# Patient Record
Sex: MALE | Race: WHITE | NOT HISPANIC OR LATINO | Employment: FULL TIME | URBAN - METROPOLITAN AREA
[De-identification: names, ages, dates, MRNs, and addresses within clinical notes are randomized per-mention and may not be internally consistent; named-entity substitution may affect disease eponyms.]

---

## 2017-05-22 ENCOUNTER — HOSPITAL ENCOUNTER (OUTPATIENT)
Dept: RADIOLOGY | Facility: HOSPITAL | Age: 37
Discharge: HOME | End: 2017-05-22
Attending: ORTHOPAEDIC SURGERY
Payer: COMMERCIAL

## 2017-05-22 ENCOUNTER — ALLSCRIPTS OFFICE VISIT (OUTPATIENT)
Dept: OTHER | Facility: OTHER | Age: 37
End: 2017-05-22

## 2017-05-22 DIAGNOSIS — M76.71 PERONEAL TENDINITIS OF RIGHT LOWER EXTREMITY: ICD-10-CM

## 2017-05-22 DIAGNOSIS — M25.571 PAIN IN RIGHT ANKLE: ICD-10-CM

## 2017-05-22 PROCEDURE — 73610 X-RAY EXAM OF ANKLE: CPT

## 2017-05-31 ENCOUNTER — APPOINTMENT (OUTPATIENT)
Dept: PHYSICAL THERAPY | Facility: OTHER | Age: 37
End: 2017-05-31
Payer: COMMERCIAL

## 2017-05-31 ENCOUNTER — GENERIC CONVERSION - ENCOUNTER (OUTPATIENT)
Dept: OTHER | Facility: OTHER | Age: 37
End: 2017-05-31

## 2017-05-31 PROCEDURE — 97140 MANUAL THERAPY 1/> REGIONS: CPT

## 2017-05-31 PROCEDURE — 97110 THERAPEUTIC EXERCISES: CPT

## 2017-05-31 PROCEDURE — 97161 PT EVAL LOW COMPLEX 20 MIN: CPT

## 2017-06-02 ENCOUNTER — APPOINTMENT (OUTPATIENT)
Dept: PHYSICAL THERAPY | Facility: OTHER | Age: 37
End: 2017-06-02
Payer: COMMERCIAL

## 2017-06-02 PROCEDURE — 97112 NEUROMUSCULAR REEDUCATION: CPT

## 2017-06-02 PROCEDURE — 97140 MANUAL THERAPY 1/> REGIONS: CPT

## 2017-06-02 PROCEDURE — 97110 THERAPEUTIC EXERCISES: CPT

## 2017-06-07 ENCOUNTER — GENERIC CONVERSION - ENCOUNTER (OUTPATIENT)
Dept: OTHER | Facility: OTHER | Age: 37
End: 2017-06-07

## 2017-06-08 ENCOUNTER — APPOINTMENT (OUTPATIENT)
Dept: PHYSICAL THERAPY | Facility: OTHER | Age: 37
End: 2017-06-08
Payer: COMMERCIAL

## 2017-06-13 ENCOUNTER — APPOINTMENT (OUTPATIENT)
Dept: PHYSICAL THERAPY | Facility: OTHER | Age: 37
End: 2017-06-13
Payer: COMMERCIAL

## 2017-06-13 PROCEDURE — 97140 MANUAL THERAPY 1/> REGIONS: CPT

## 2017-06-13 PROCEDURE — 97110 THERAPEUTIC EXERCISES: CPT

## 2017-06-13 PROCEDURE — 97112 NEUROMUSCULAR REEDUCATION: CPT

## 2017-06-15 ENCOUNTER — APPOINTMENT (OUTPATIENT)
Dept: PHYSICAL THERAPY | Facility: OTHER | Age: 37
End: 2017-06-15
Payer: COMMERCIAL

## 2017-06-21 ENCOUNTER — APPOINTMENT (OUTPATIENT)
Dept: PHYSICAL THERAPY | Facility: OTHER | Age: 37
End: 2017-06-21
Payer: COMMERCIAL

## 2017-06-21 PROCEDURE — 97112 NEUROMUSCULAR REEDUCATION: CPT

## 2017-06-21 PROCEDURE — 97110 THERAPEUTIC EXERCISES: CPT

## 2017-06-21 PROCEDURE — 97140 MANUAL THERAPY 1/> REGIONS: CPT

## 2017-06-27 ENCOUNTER — APPOINTMENT (OUTPATIENT)
Dept: PHYSICAL THERAPY | Facility: OTHER | Age: 37
End: 2017-06-27
Payer: COMMERCIAL

## 2017-06-27 ENCOUNTER — ALLSCRIPTS OFFICE VISIT (OUTPATIENT)
Dept: OTHER | Facility: OTHER | Age: 37
End: 2017-06-27

## 2017-06-27 PROCEDURE — 97112 NEUROMUSCULAR REEDUCATION: CPT

## 2017-06-27 PROCEDURE — 97140 MANUAL THERAPY 1/> REGIONS: CPT

## 2017-06-27 PROCEDURE — 97110 THERAPEUTIC EXERCISES: CPT

## 2017-06-29 ENCOUNTER — APPOINTMENT (OUTPATIENT)
Dept: PHYSICAL THERAPY | Facility: OTHER | Age: 37
End: 2017-06-29
Payer: COMMERCIAL

## 2017-06-29 PROCEDURE — 97110 THERAPEUTIC EXERCISES: CPT

## 2017-06-29 PROCEDURE — 97140 MANUAL THERAPY 1/> REGIONS: CPT

## 2017-07-03 ENCOUNTER — APPOINTMENT (OUTPATIENT)
Dept: PHYSICAL THERAPY | Facility: OTHER | Age: 37
End: 2017-07-03
Payer: COMMERCIAL

## 2017-07-03 PROCEDURE — 97110 THERAPEUTIC EXERCISES: CPT

## 2017-07-03 PROCEDURE — 97112 NEUROMUSCULAR REEDUCATION: CPT

## 2017-07-03 PROCEDURE — 97140 MANUAL THERAPY 1/> REGIONS: CPT

## 2017-07-06 ENCOUNTER — APPOINTMENT (OUTPATIENT)
Dept: PHYSICAL THERAPY | Facility: OTHER | Age: 37
End: 2017-07-06
Payer: COMMERCIAL

## 2018-01-13 VITALS
SYSTOLIC BLOOD PRESSURE: 110 MMHG | BODY MASS INDEX: 26.81 KG/M2 | DIASTOLIC BLOOD PRESSURE: 70 MMHG | HEIGHT: 69 IN | WEIGHT: 181 LBS | HEART RATE: 63 BPM

## 2018-01-14 VITALS
BODY MASS INDEX: 26.58 KG/M2 | SYSTOLIC BLOOD PRESSURE: 120 MMHG | WEIGHT: 180 LBS | DIASTOLIC BLOOD PRESSURE: 69 MMHG | HEART RATE: 54 BPM

## 2018-10-28 ENCOUNTER — APPOINTMENT (EMERGENCY)
Dept: RADIOLOGY | Facility: HOSPITAL | Age: 38
End: 2018-10-28
Payer: COMMERCIAL

## 2018-10-28 ENCOUNTER — HOSPITAL ENCOUNTER (EMERGENCY)
Facility: HOSPITAL | Age: 38
Discharge: HOME | End: 2018-10-28
Attending: EMERGENCY MEDICINE
Payer: COMMERCIAL

## 2018-10-28 VITALS
BODY MASS INDEX: 26.5 KG/M2 | TEMPERATURE: 100.8 F | WEIGHT: 179.45 LBS | DIASTOLIC BLOOD PRESSURE: 61 MMHG | RESPIRATION RATE: 20 BRPM | SYSTOLIC BLOOD PRESSURE: 141 MMHG | HEART RATE: 105 BPM | OXYGEN SATURATION: 94 %

## 2018-10-28 DIAGNOSIS — J18.9 COMMUNITY ACQUIRED PNEUMONIA: Primary | ICD-10-CM

## 2018-10-28 LAB
ALBUMIN SERPL BCP-MCNC: 3.5 G/DL (ref 3.5–5)
ALP SERPL-CCNC: 67 U/L (ref 46–116)
ALT SERPL W P-5'-P-CCNC: 45 U/L (ref 12–78)
ANION GAP SERPL CALCULATED.3IONS-SCNC: 10 MMOL/L (ref 4–13)
AST SERPL W P-5'-P-CCNC: 23 U/L (ref 5–45)
BASOPHILS # BLD AUTO: 0.01 THOUSANDS/ΜL (ref 0–0.1)
BASOPHILS NFR BLD AUTO: 0 % (ref 0–1)
BILIRUB SERPL-MCNC: 1 MG/DL (ref 0.2–1)
BUN SERPL-MCNC: 11 MG/DL (ref 5–25)
CALCIUM SERPL-MCNC: 8.9 MG/DL (ref 8.3–10.1)
CHLORIDE SERPL-SCNC: 100 MMOL/L (ref 100–108)
CO2 SERPL-SCNC: 28 MMOL/L (ref 21–32)
CREAT SERPL-MCNC: 1.16 MG/DL (ref 0.6–1.3)
EOSINOPHIL # BLD AUTO: 0.11 THOUSAND/ΜL (ref 0–0.61)
EOSINOPHIL NFR BLD AUTO: 3 % (ref 0–6)
ERYTHROCYTE [DISTWIDTH] IN BLOOD BY AUTOMATED COUNT: 12.1 % (ref 11.6–15.1)
GFR SERPL CREATININE-BSD FRML MDRD: 78 ML/MIN/1.73SQ M
GLUCOSE SERPL-MCNC: 120 MG/DL (ref 65–140)
HCT VFR BLD AUTO: 37.2 % (ref 36.5–49.3)
HGB BLD-MCNC: 12.5 G/DL (ref 12–17)
IMM GRANULOCYTES # BLD AUTO: 0.01 THOUSAND/UL (ref 0–0.2)
IMM GRANULOCYTES NFR BLD AUTO: 0 % (ref 0–2)
INR PPP: 1.11 (ref 0.86–1.17)
LACTATE SERPL-SCNC: 1 MMOL/L (ref 0.5–2)
LYMPHOCYTES # BLD AUTO: 0.63 THOUSANDS/ΜL (ref 0.6–4.47)
LYMPHOCYTES NFR BLD AUTO: 15 % (ref 14–44)
MCH RBC QN AUTO: 30 PG (ref 26.8–34.3)
MCHC RBC AUTO-ENTMCNC: 33.6 G/DL (ref 31.4–37.4)
MCV RBC AUTO: 89 FL (ref 82–98)
MONOCYTES # BLD AUTO: 0.47 THOUSAND/ΜL (ref 0.17–1.22)
MONOCYTES NFR BLD AUTO: 11 % (ref 4–12)
NEUTROPHILS # BLD AUTO: 3.11 THOUSANDS/ΜL (ref 1.85–7.62)
NEUTS SEG NFR BLD AUTO: 71 % (ref 43–75)
NRBC BLD AUTO-RTO: 0 /100 WBCS
PLATELET # BLD AUTO: 200 THOUSANDS/UL (ref 149–390)
PMV BLD AUTO: 10.5 FL (ref 8.9–12.7)
POTASSIUM SERPL-SCNC: 3.4 MMOL/L (ref 3.5–5.3)
PROT SERPL-MCNC: 7 G/DL (ref 6.4–8.2)
PROTHROMBIN TIME: 14 SECONDS (ref 11.8–14.2)
RBC # BLD AUTO: 4.17 MILLION/UL (ref 3.88–5.62)
SODIUM SERPL-SCNC: 138 MMOL/L (ref 136–145)
WBC # BLD AUTO: 4.34 THOUSAND/UL (ref 4.31–10.16)

## 2018-10-28 PROCEDURE — 99283 EMERGENCY DEPT VISIT LOW MDM: CPT

## 2018-10-28 PROCEDURE — 87631 RESP VIRUS 3-5 TARGETS: CPT | Performed by: EMERGENCY MEDICINE

## 2018-10-28 PROCEDURE — 36415 COLL VENOUS BLD VENIPUNCTURE: CPT | Performed by: EMERGENCY MEDICINE

## 2018-10-28 PROCEDURE — 85610 PROTHROMBIN TIME: CPT | Performed by: EMERGENCY MEDICINE

## 2018-10-28 PROCEDURE — 83605 ASSAY OF LACTIC ACID: CPT | Performed by: EMERGENCY MEDICINE

## 2018-10-28 PROCEDURE — 71046 X-RAY EXAM CHEST 2 VIEWS: CPT

## 2018-10-28 PROCEDURE — 85025 COMPLETE CBC W/AUTO DIFF WBC: CPT | Performed by: EMERGENCY MEDICINE

## 2018-10-28 PROCEDURE — 80053 COMPREHEN METABOLIC PANEL: CPT | Performed by: EMERGENCY MEDICINE

## 2018-10-28 RX ORDER — DOXYCYCLINE HYCLATE 100 MG/1
100 CAPSULE ORAL ONCE
Status: DISCONTINUED | OUTPATIENT
Start: 2018-10-28 | End: 2018-10-28 | Stop reason: HOSPADM

## 2018-10-28 RX ORDER — BENZONATATE 100 MG/1
100 CAPSULE ORAL 3 TIMES DAILY PRN
Qty: 15 CAPSULE | Refills: 0 | Status: SHIPPED | OUTPATIENT
Start: 2018-10-28 | End: 2019-07-27

## 2018-10-28 RX ORDER — LEVOFLOXACIN 750 MG/1
750 TABLET ORAL EVERY 24 HOURS
Qty: 4 TABLET | Refills: 0 | Status: SHIPPED | OUTPATIENT
Start: 2018-10-29 | End: 2018-11-02

## 2018-10-28 RX ORDER — DOXYCYCLINE HYCLATE 100 MG/1
100 CAPSULE ORAL 2 TIMES DAILY
Qty: 13 CAPSULE | Refills: 0 | Status: SHIPPED | OUTPATIENT
Start: 2018-10-28 | End: 2018-10-28

## 2018-10-28 RX ORDER — 0.9 % SODIUM CHLORIDE 0.9 %
3 VIAL (ML) INJECTION AS NEEDED
Status: DISCONTINUED | OUTPATIENT
Start: 2018-10-28 | End: 2018-10-28 | Stop reason: HOSPADM

## 2018-10-28 RX ORDER — ONDANSETRON 4 MG/1
4 TABLET, ORALLY DISINTEGRATING ORAL ONCE
Status: COMPLETED | OUTPATIENT
Start: 2018-10-28 | End: 2018-10-28

## 2018-10-28 RX ORDER — IBUPROFEN 400 MG/1
400 TABLET ORAL ONCE
Status: COMPLETED | OUTPATIENT
Start: 2018-10-28 | End: 2018-10-28

## 2018-10-28 RX ORDER — ONDANSETRON 4 MG/1
4 TABLET, ORALLY DISINTEGRATING ORAL EVERY 6 HOURS PRN
Qty: 12 TABLET | Refills: 0 | Status: SHIPPED | OUTPATIENT
Start: 2018-10-28 | End: 2019-07-27

## 2018-10-28 RX ORDER — ACETAMINOPHEN 325 MG/1
650 TABLET ORAL ONCE
Status: COMPLETED | OUTPATIENT
Start: 2018-10-28 | End: 2018-10-28

## 2018-10-28 RX ADMIN — ACETAMINOPHEN 650 MG: 325 TABLET, FILM COATED ORAL at 12:18

## 2018-10-28 RX ADMIN — IBUPROFEN 400 MG: 400 TABLET ORAL at 13:21

## 2018-10-28 RX ADMIN — LEVOFLOXACIN 750 MG: 500 TABLET, FILM COATED ORAL at 12:32

## 2018-10-28 RX ADMIN — ONDANSETRON 4 MG: 4 TABLET, ORALLY DISINTEGRATING ORAL at 12:22

## 2018-10-28 NOTE — DISCHARGE INSTRUCTIONS
Drink plenty of fluids to stay well hydrated  Take levofloxacin orally daily for 4 days starting on Monday October 29th  You may take ondansetron as directed if needed for nausea  Community Acquired Pneumonia   WHAT YOU NEED TO KNOW:   Community-acquired pneumonia (CAP) is a lung infection that you get outside of a hospital or nursing home setting  Your lungs become inflamed and cannot work well  CAP may be caused by bacteria, viruses, or fungi  DISCHARGE INSTRUCTIONS:   Seek care immediately if:   · You are confused and cannot think clearly  · You have increased trouble breathing  · Your lips or fingernails turn gray or blue  Contact your healthcare provider if:   · Your symptoms do not get better, or they get worse  · You are urinating less, or not at all  · You have questions or concerns about your condition or care  Medicines:   · Medicines  may be given to treat a bacterial, viral, or fungal infection  You may also be given medicines to dilate your bronchial tubes to help you breathe more easily  · Take your medicine as directed  Contact your healthcare provider if you think your medicine is not helping or if you have side effects  Tell him or her if you are allergic to any medicine  Keep a list of the medicines, vitamins, and herbs you take  Include the amounts, and when and why you take them  Bring the list or the pill bottles to follow-up visits  Carry your medicine list with you in case of an emergency  Follow up with your healthcare provider within 3 days or as directed: You may need another x-ray  Write down your questions so you remember to ask them during your visits  Deep breathing and coughing:  Deep breathing helps open the air passages in your lungs  Coughing helps bring up mucus from your lungs  Take a deep breath and hold the breath as long as you can  Then push the air out of your lungs with a deep, strong cough  Spit out any mucus you have coughed up   Take 10 deep breaths in a row every hour that you are awake  Remember to follow each deep breath with a cough  Do not smoke or allow others to smoke around you:  Nicotine and other chemicals in cigarettes and cigars can cause lung damage  Ask your healthcare provider for information if you currently smoke and need help to quit  E-cigarettes or smokeless tobacco still contain nicotine  Talk to your healthcare provider before you use these products  Manage CAP at home:   · Breathe warm, moist air  This helps loosen mucus  Loosely place a warm, wet washcloth over your nose and mouth  A room humidifier may also help make the air moist     · Drink liquids as directed  Ask your healthcare provider how much liquid to drink each day and which liquids to drink  Liquids help make mucus thin and easier to get out of your body  · Gently tap your chest   This helps loosen mucus so it is easier to cough  Lie with your head lower than your chest several times a day and tap your chest      · Get plenty of rest   Rest helps your body heal   Prevent CAP:   · Wash your hands often with soap and water  Carry germ-killing hand gel with you  You can use the gel to clean your hands when soap and water are not available  Do not touch your eyes, nose, or mouth unless you have washed your hands first      · Clean surfaces often  Clean doorknobs, countertops, cell phones, and other surfaces that are touched often  · Always cover your mouth when you cough  Cough into a tissue or your shirtsleeve so you do not spread germs from your hands  · Try to avoid people who have a cold or the flu  If you are sick, stay away from others as much as possible  · Ask about vaccines  You may need a vaccine to help prevent pneumonia  Get an influenza (flu) vaccine every year as soon as it becomes available    © 2017 [employer] Information is for End User's use only and may not be sold, redistributed or otherwise used for commercial purposes  All illustrations and images included in CareNotes® are the copyrighted property of A [employer]  or [employer]  The above information is an  only  It is not intended as medical advice for individual conditions or treatments  Talk to your doctor, nurse or pharmacist before following any medical regimen to see if it is safe and effective for you

## 2018-10-28 NOTE — ED PROVIDER NOTES
History  Chief Complaint   Patient presents with    Fever - 9 weeks to 74 years     cough and throat discomfort since thursday, followed by fevers of 102-103, +chills     68-year-old male presents to the ED from home for evaluation with fever and cough  He notes that on his way to work on Thursday (3 days ago) he picked up [employer] lozenges for a slightly sore throat  He relates that later in the day he felt "crummy and chilled "  He relates that he took some ibuprofen and felt improved  He notes that on Friday he awoke feeling "okay " He worked all day and near the end relates that he felt like he was "crashing "  He has had myalgias since and on Saturday morning identified presence of fever in the 102 range  This responded partially to acetaminophen throughout the day yesterday  He has had ongoing nasal congestion since Wednesday, sore throat and cough  Cough has been minimally productive of white sputum  There has not been any hemoptysis  He did note intense chest discomfort on Wednesday when he went for a run  He appreciated significant expiratory wheezes following that which resolved w/ rest and did note some wheezes again overnight  He notes that he does not have a history of being bronchospastic  He is healthy at baseline and notes that he occasionally uses loratadine for hayfever  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   His wife works in a pediatric ED and his children have been sick with similar symptoms over this past week  One of their friends was additionally diagnosed with adenovirus last week  Family history significant for to siblings with asthma  He will be receiving his annual flu vaccine soon  None       History reviewed  No pertinent past medical history  History reviewed  No pertinent surgical history  History reviewed  No pertinent family history  I have reviewed and agree with the history as documented      Social History   Substance Use Topics    Smoking status: Passive Smoke Exposure - Never Smoker    Smokeless tobacco: Never Used    Alcohol use No        Review of Systems   Gastrointestinal: Negative for diarrhea, nausea and vomiting  Genitourinary: Negative for difficulty urinating  Skin: Negative for rash  All other systems reviewed and are negative  Physical Exam  Physical Exam   Constitutional: He is oriented to person, place, and time  He appears well-developed and well-nourished  HENT:   Head: Normocephalic  Right Ear: Tympanic membrane and ear canal normal    Left Ear: Tympanic membrane and ear canal normal    Nose: Rhinorrhea present  Mouth/Throat: Uvula is midline and oropharynx is clear and moist    Eyes: Conjunctivae and EOM are normal    Cardiovascular: Regular rhythm  Pulses:       Posterior tibial pulses are 2+ on the right side, and 2+ on the left side  Heart rate mildly elevated in the 90s  Pulmonary/Chest: Effort normal    Initial oxygen saturation was 94% on room air  With application of probe on the ear oxygen saturation picking up consistently at 100%  Not hypoxic  Patient very slightly coarse in the bibasilar region  Musculoskeletal: Normal range of motion  He exhibits no edema  Neurological: He is alert and oriented to person, place, and time  Skin: Skin is warm and dry  Psychiatric: He has a normal mood and affect  His behavior is normal    Nursing note and vitals reviewed        Vital Signs  ED Triage Vitals [10/28/18 1106]   Temperature Pulse Respirations Blood Pressure SpO2   (!) 100 8 °F (38 2 °C) 105 20 141/61 94 %      Temp Source Heart Rate Source Patient Position - Orthostatic VS BP Location FiO2 (%)   Oral -- -- Right arm --      Pain Score       --           Vitals:    10/28/18 1106   BP: 141/61   Pulse: 105       Visual Acuity      ED Medications  Medications   acetaminophen (TYLENOL) tablet 650 mg (650 mg Oral Given 10/28/18 1218)   ondansetron (ZOFRAN-ODT) dispersible tablet 4 mg (4 mg Oral Given 10/28/18 1222)   levofloxacin (LEVAQUIN) tablet 750 mg (750 mg Oral Given 10/28/18 1232)   ibuprofen (MOTRIN) tablet 400 mg (400 mg Oral Given 10/28/18 1321)       Diagnostic Studies  Results Reviewed     Procedure Component Value Units Date/Time    Lactic Acid x2 [[id number]]  (Normal) Collected:  10/28/18 1151    Lab Status:  Final result Specimen:  Blood from Arm, Right Updated:  10/28/18 1216     LACTIC ACID 1 0 mmol/L     Narrative:         Result may be elevated if tourniquet was used during collection  Comprehensive metabolic panel [[id number]]  (Abnormal) Collected:  10/28/18 1140    Lab Status:  Final result Specimen:  Blood from Arm, Left Updated:  10/28/18 1202     Sodium 138 mmol/L      Potassium 3 4 (L) mmol/L      Chloride 100 mmol/L      CO2 28 mmol/L      ANION GAP 10 mmol/L      BUN 11 mg/dL      Creatinine 1 16 mg/dL      Glucose 120 mg/dL      Calcium 8 9 mg/dL      AST 23 U/L      ALT 45 U/L      Alkaline Phosphatase 67 U/L      Total Protein 7 0 g/dL      Albumin 3 5 g/dL      Total Bilirubin 1 00 mg/dL      eGFR 78 ml/min/1 73sq m     Narrative:         National Kidney Disease Education Program recommendations are as follows:  GFR calculation is accurate only with a steady state creatinine  Chronic Kidney disease less than 60 ml/min/1 73 sq  meters  Kidney failure less than 15 ml/min/1 73 sq  meters      Protime-INR [[id number]]  (Normal) Collected:  10/28/18 1140    Lab Status:  Final result Specimen:  Blood from Arm, Left Updated:  10/28/18 1155     Protime 14 0 seconds      INR 1 11    CBC and differential [[id number]] Collected:  10/28/18 1140    Lab Status:  Final result Specimen:  Blood from Arm, Left Updated:  10/28/18 1146     WBC 4 34 Thousand/uL      RBC 4 17 Million/uL      Hemoglobin 12 5 g/dL      Hematocrit 37 2 %      MCV 89 fL      MCH 30 0 pg      MCHC 33 6 g/dL      RDW 12 1 %      MPV 10 5 fL      Platelets 161 Thousands/uL      nRBC 0 /100 WBCs      Neutrophils Relative 71 % Immat GRANS % 0 %      Lymphocytes Relative 15 %      Monocytes Relative 11 %      Eosinophils Relative 3 %      Basophils Relative 0 %      Neutrophils Absolute 3 11 Thousands/µL      Immature Grans Absolute 0 01 Thousand/uL      Lymphocytes Absolute 0 63 Thousands/µL      Monocytes Absolute 0 47 Thousand/µL      Eosinophils Absolute 0 11 Thousand/µL      Basophils Absolute 0 01 Thousands/µL     Influenza A/B and RSV by PCR (indicated for patients >2 mo of age) [[id number]] Collected:  10/28/18 1139    Lab Status: In process Specimen:  Nasopharyngeal from Nasopharyngeal Swab Updated:  10/28/18 1142    Lactic Acid x2 [[id number]]     Lab Status:  No result Specimen:  Blood                  XR chest 2 views   ED Interpretation by Dick Peterson MD (10/28 1211)   Small left lower lobe infiltrate                 Procedures  Procedures       Phone Contacts  ED Phone Contact    ED Course                               MDM  Number of Diagnoses or Management Options  Community acquired pneumonia:   Diagnosis management comments: Study results including presence of left lower infiltrate reviewed with patient  Imaging and blood work reviewed  Findings are most concerning for community-acquired pneumonia  Azithromycin would typically be 1st line though as discussed with patient given likely exposures at work to ill patients would consider levofloxacin for better coverage  Patient initially hesitant and decision was made for use of doxycycline  After short period of time in discussion with a family member who was an infectious disease specialist treatment plan was switched to levofloxacin  Ondansetron additionally prescribed for nausea which recently started and Tessalon to use as needed for symptomatic treatment      CritCare Time    Disposition  Final diagnoses:   Community acquired pneumonia     Time reflects when diagnosis was documented in both MDM as applicable and the Disposition within this note Time User Action Codes Description Comment    10/28/2018 12:12 PM Rody SANDOVAL Add [J18 9] Community acquired pneumonia       ED Disposition     ED Disposition Condition Comment    Discharge  Hoda Jean discharge to home/self care  Condition at discharge: Good        Follow-up Information     Follow up With Specialties Details Why Contact Info Additional [address], DO Internal Medicine  As needed [place] [address] Emergency Department Emergency Medicine  As needed, If symptoms worsen [address] [facility] [address], [address], [place], [place], [address]          Discharge Medication List as of 10/28/2018 12:30 PM      START taking these medications    Details   levofloxacin (LEVAQUIN) 750 mg tablet Take 1 tablet (750 mg total) by mouth every 24 hours for 4 days, Starting Mon 10/29/2018, Until Fri 11/2/2018, Normal      ondansetron (ZOFRAN-ODT) 4 mg disintegrating tablet Take 1 tablet (4 mg total) by mouth every 6 (six) hours as needed for nausea, Starting Sun 10/28/2018, Normal           No discharge procedures on file      ED Provider  Electronically Signed by           Jason Loyd MD  10/28/18 2242

## 2018-10-29 LAB
FLUAV AG SPEC QL: NORMAL
FLUBV AG SPEC QL: NORMAL
RSV B RNA SPEC QL NAA+PROBE: NORMAL

## 2019-07-27 ENCOUNTER — APPOINTMENT (EMERGENCY)
Dept: RADIOLOGY | Facility: HOSPITAL | Age: 39
End: 2019-07-27
Payer: COMMERCIAL

## 2019-07-27 ENCOUNTER — HOSPITAL ENCOUNTER (EMERGENCY)
Facility: HOSPITAL | Age: 39
Discharge: HOME | End: 2019-07-27
Attending: EMERGENCY MEDICINE
Payer: COMMERCIAL

## 2019-07-27 VITALS
SYSTOLIC BLOOD PRESSURE: 132 MMHG | RESPIRATION RATE: 18 BRPM | OXYGEN SATURATION: 99 % | TEMPERATURE: 97.5 F | WEIGHT: 177 LBS | HEART RATE: 55 BPM | DIASTOLIC BLOOD PRESSURE: 78 MMHG | BODY MASS INDEX: 26.14 KG/M2

## 2019-07-27 DIAGNOSIS — R10.9 ABDOMINAL PAIN: Primary | ICD-10-CM

## 2019-07-27 DIAGNOSIS — R19.7 NAUSEA VOMITING AND DIARRHEA: ICD-10-CM

## 2019-07-27 DIAGNOSIS — R11.2 NAUSEA VOMITING AND DIARRHEA: ICD-10-CM

## 2019-07-27 DIAGNOSIS — K57.30 DIVERTICULOSIS OF COLON: ICD-10-CM

## 2019-07-27 LAB
ALBUMIN SERPL BCP-MCNC: 3.9 G/DL (ref 3.5–5)
ALP SERPL-CCNC: 76 U/L (ref 46–116)
ALT SERPL W P-5'-P-CCNC: 44 U/L (ref 12–78)
ANION GAP SERPL CALCULATED.3IONS-SCNC: 4 MMOL/L (ref 4–13)
AST SERPL W P-5'-P-CCNC: 20 U/L (ref 5–45)
BASOPHILS # BLD AUTO: 0.03 THOUSANDS/ΜL (ref 0–0.1)
BASOPHILS NFR BLD AUTO: 1 % (ref 0–1)
BILIRUB SERPL-MCNC: 0.94 MG/DL (ref 0.2–1)
BILIRUB UR QL STRIP: NEGATIVE
BUN SERPL-MCNC: 14 MG/DL (ref 5–25)
CALCIUM SERPL-MCNC: 8.7 MG/DL (ref 8.3–10.1)
CHLORIDE SERPL-SCNC: 107 MMOL/L (ref 100–108)
CLARITY UR: CLEAR
CO2 SERPL-SCNC: 28 MMOL/L (ref 21–32)
COLOR UR: YELLOW
COLOR, POC: NORMAL
CREAT SERPL-MCNC: 0.94 MG/DL (ref 0.6–1.3)
EOSINOPHIL # BLD AUTO: 0.26 THOUSAND/ΜL (ref 0–0.61)
EOSINOPHIL NFR BLD AUTO: 5 % (ref 0–6)
ERYTHROCYTE [DISTWIDTH] IN BLOOD BY AUTOMATED COUNT: 12.2 % (ref 11.6–15.1)
GFR SERPL CREATININE-BSD FRML MDRD: 100 ML/MIN/1.73SQ M
GLUCOSE SERPL-MCNC: 88 MG/DL (ref 65–140)
GLUCOSE UR STRIP-MCNC: NEGATIVE MG/DL
HCT VFR BLD AUTO: 42.7 % (ref 36.5–49.3)
HGB BLD-MCNC: 14.4 G/DL (ref 12–17)
HGB UR QL STRIP.AUTO: NEGATIVE
IMM GRANULOCYTES # BLD AUTO: 0.01 THOUSAND/UL (ref 0–0.2)
IMM GRANULOCYTES NFR BLD AUTO: 0 % (ref 0–2)
KETONES UR STRIP-MCNC: ABNORMAL MG/DL
LEUKOCYTE ESTERASE UR QL STRIP: NEGATIVE
LIPASE SERPL-CCNC: 197 U/L (ref 73–393)
LYMPHOCYTES # BLD AUTO: 1.4 THOUSANDS/ΜL (ref 0.6–4.47)
LYMPHOCYTES NFR BLD AUTO: 29 % (ref 14–44)
MCH RBC QN AUTO: 30.1 PG (ref 26.8–34.3)
MCHC RBC AUTO-ENTMCNC: 33.7 G/DL (ref 31.4–37.4)
MCV RBC AUTO: 89 FL (ref 82–98)
MONOCYTES # BLD AUTO: 0.47 THOUSAND/ΜL (ref 0.17–1.22)
MONOCYTES NFR BLD AUTO: 10 % (ref 4–12)
NEUTROPHILS # BLD AUTO: 2.64 THOUSANDS/ΜL (ref 1.85–7.62)
NEUTS SEG NFR BLD AUTO: 55 % (ref 43–75)
NITRITE UR QL STRIP: NEGATIVE
NRBC BLD AUTO-RTO: 0 /100 WBCS
PH UR STRIP.AUTO: 6 [PH] (ref 4.5–8)
PLATELET # BLD AUTO: 207 THOUSANDS/UL (ref 149–390)
PMV BLD AUTO: 10.8 FL (ref 8.9–12.7)
POTASSIUM SERPL-SCNC: 3.6 MMOL/L (ref 3.5–5.3)
PROT SERPL-MCNC: 7.1 G/DL (ref 6.4–8.2)
PROT UR STRIP-MCNC: NEGATIVE MG/DL
RBC # BLD AUTO: 4.78 MILLION/UL (ref 3.88–5.62)
SODIUM SERPL-SCNC: 139 MMOL/L (ref 136–145)
SP GR UR STRIP.AUTO: 1.02 (ref 1–1.03)
UROBILINOGEN UR QL STRIP.AUTO: 0.2 E.U./DL
WBC # BLD AUTO: 4.81 THOUSAND/UL (ref 4.31–10.16)

## 2019-07-27 PROCEDURE — 74177 CT ABD & PELVIS W/CONTRAST: CPT

## 2019-07-27 PROCEDURE — 80053 COMPREHEN METABOLIC PANEL: CPT | Performed by: EMERGENCY MEDICINE

## 2019-07-27 PROCEDURE — 85025 COMPLETE CBC W/AUTO DIFF WBC: CPT | Performed by: EMERGENCY MEDICINE

## 2019-07-27 PROCEDURE — 99284 EMERGENCY DEPT VISIT MOD MDM: CPT

## 2019-07-27 PROCEDURE — 99284 EMERGENCY DEPT VISIT MOD MDM: CPT | Performed by: EMERGENCY MEDICINE

## 2019-07-27 PROCEDURE — 83690 ASSAY OF LIPASE: CPT | Performed by: EMERGENCY MEDICINE

## 2019-07-27 PROCEDURE — 36415 COLL VENOUS BLD VENIPUNCTURE: CPT | Performed by: EMERGENCY MEDICINE

## 2019-07-27 PROCEDURE — 81003 URINALYSIS AUTO W/O SCOPE: CPT

## 2019-07-27 RX ADMIN — IOHEXOL 100 ML: 350 INJECTION, SOLUTION INTRAVENOUS at 08:50

## 2019-07-27 NOTE — ED ATTENDING ATTESTATION
Kay Perez MD, saw and evaluated the patient  I have discussed the patient with the resident/non-physician practitioner and agree with the resident's/non-physician practitioner's findings, Plan of Care, and MDM as documented in the resident's/non-physician practitioner's note, except where noted  All available labs and Radiology studies were reviewed  I was present for key portions of any procedure(s) performed by the resident/non-physician practitioner and I was immediately available to provide assistance  At this point I agree with the current assessment done in the Emergency Department  I have conducted an independent evaluation of this patient a history and physical is as follows:    Patient presents the emergency department the complaint of feeling poorly for the last 5 days  The patient reports that he was in his normal state of good health until Tuesday when he developed fatigue nausea, cramping, and decreased appetite  The patient checked his tympanic temperature on the day and found to be 100 4  Since that time the patient has continued to have nausea, postprandial abdominal discomfort, and decreased appetite  The patient had diarrhea 4 days ago that lasted for 1 day but his bowels have been normal since then  The patient vomited once  The emesis occurred last evening  The patient reports occasional, waxing and waning, periumbilical discomfort  The patient denies any melena or hematochezia  There is no hematemesis  The patient had been exposed to his daughter would been sick with vomiting and abdominal discomfort 1 week prior to his illness and the neighbor's child who had been sick 3 days prior to the onset of his illness  Physical exam demonstrates a male in no acute distress  The patient appears to be well-hydrated  HEENT exam was normal   Lungs are clear with equal breath sounds  The heart had a regular rate rhythm    The abdomen is soft with minimal periumbilical and midepigastric tenderness  There is no rebound or guarding  No masses were appreciated  Extremities are symmetric and nontender  Skin had no rash      Critical Care Time  Procedures

## 2019-07-27 NOTE — ED PROVIDER NOTES
History  Chief Complaint   Patient presents with    Vomiting     gastric symptoms since tuesday, nausea, vomiting, diarrhea, fever    Fever - 9 weeks to 76 years     57-year-old man with a past medical history of hyperlipidemia presents for evaluation of abdominal pain  Symptoms started 5 days ago  He initially experience fever, chills and watery diarrhea  His children at home experienced similar symptoms and are now well  He has continued to experience abdominal pain that is postprandial   He describes it as bloating sensation  It is relieved with vomiting and Zofran  The pain is periumbilical and does not radiate  Patient has had no further episodes of diarrhea  Denies fever, chills at this time  Denies dysuria, hematuria, cp, sob  None       Past Medical History:   Diagnosis Date    Hyperlipidemia        History reviewed  No pertinent surgical history  History reviewed  No pertinent family history  I have reviewed and agree with the history as documented  Social History     Tobacco Use    Smoking status: Never Smoker    Smokeless tobacco: Never Used   Substance Use Topics    Alcohol use: No    Drug use: No        Review of Systems   Constitutional: Positive for chills, diaphoresis and fever  Negative for appetite change and fatigue  HENT: Negative for congestion, rhinorrhea and sore throat  Respiratory: Negative for apnea, cough, choking, chest tightness, shortness of breath, wheezing and stridor  Cardiovascular: Negative for chest pain, palpitations and leg swelling  Gastrointestinal: Positive for abdominal pain, diarrhea, nausea and vomiting  Negative for abdominal distention and constipation  Genitourinary: Negative for dysuria and hematuria  Musculoskeletal: Negative for back pain, neck pain and neck stiffness  Skin: Negative for pallor, rash and wound  Neurological: Negative for dizziness, light-headedness and headaches     Psychiatric/Behavioral: Negative for behavioral problems and confusion  All other systems reviewed and are negative  Physical Exam  ED Triage Vitals   Temperature Pulse Respirations Blood Pressure SpO2   07/27/19 0657 07/27/19 0657 07/27/19 0657 07/27/19 0700 07/27/19 0657   97 5 °F (36 4 °C) 66 18 124/78 97 %      Temp Source Heart Rate Source Patient Position - Orthostatic VS BP Location FiO2 (%)   07/27/19 0657 07/27/19 0927 07/27/19 0700 07/27/19 0700 --   Oral Monitor Lying Right arm       Pain Score       07/27/19 0657       1             Orthostatic Vital Signs  Vitals:    07/27/19 0657 07/27/19 0700 07/27/19 0927   BP:  124/78 132/78   Pulse: 66  55   Patient Position - Orthostatic VS:  Lying Lying       Physical Exam   Constitutional: He is oriented to person, place, and time  He appears well-developed and well-nourished  No distress  HENT:   Head: Normocephalic and atraumatic  Eyes: Pupils are equal, round, and reactive to light  Conjunctivae and EOM are normal  No scleral icterus  Neck: Normal range of motion  Neck supple  Cardiovascular: Normal rate, regular rhythm and normal heart sounds  No murmur heard  Pulmonary/Chest: Effort normal and breath sounds normal  No respiratory distress  He has no wheezes  Abdominal: Soft  Bowel sounds are normal  He exhibits no distension and no mass  There is tenderness  There is no rebound and no guarding  Periumbilical tenderness to palpation   Musculoskeletal: Normal range of motion  He exhibits no edema  Neurological: He is alert and oriented to person, place, and time  He displays normal reflexes  No cranial nerve deficit or sensory deficit  He exhibits normal muscle tone  Coordination normal    Skin: Skin is warm and dry  No rash noted  He is not diaphoretic  Psychiatric: He has a normal mood and affect  His behavior is normal    Nursing note and vitals reviewed        ED Medications  Medications   iohexol (OMNIPAQUE) 350 MG/ML injection (MULTI-DOSE) 100 mL (100 mL Intravenous Given 7/27/19 0850)       Diagnostic Studies  Results Reviewed     Procedure Component Value Units Date/Time    Comprehensive metabolic panel [[id number]] Collected:  07/27/19 0736    Lab Status:  Final result Specimen:  Blood from Arm, Left Updated:  07/27/19 0758     Sodium 139 mmol/L      Potassium 3 6 mmol/L      Chloride 107 mmol/L      CO2 28 mmol/L      ANION GAP 4 mmol/L      BUN 14 mg/dL      Creatinine 0 94 mg/dL      Glucose 88 mg/dL      Calcium 8 7 mg/dL      AST 20 U/L      ALT 44 U/L      Alkaline Phosphatase 76 U/L      Total Protein 7 1 g/dL      Albumin 3 9 g/dL      Total Bilirubin 0 94 mg/dL      eGFR 100 ml/min/1 73sq m     Narrative:       National Kid[employer] Disease Foundation guidelines for Chronic Kidney Disease (CKD):     Stage 1 with normal or high GFR (GFR > 90 mL/min/1 73 square meters)    Stage 2 Mild CKD (GFR = 60-89 mL/min/1 73 square meters)    Stage 3A Moderate CKD (GFR = 45-59 mL/min/1 73 square meters)    Stage 3B Moderate CKD (GFR = 30-44 mL/min/1 73 square meters)    Stage 4 Severe CKD (GFR = 15-29 mL/min/1 73 square meters)    Stage 5 End Stage CKD (GFR <15 mL/min/1 73 square meters)  Note: GFR calculation is accurate only with a steady state creatinine    Lipase [[id number]]  (Normal) Collected:  07/27/19 0736    Lab Status:  Final result Specimen:  Blood from Arm, Left Updated:  07/27/19 0758     Lipase 197 u/L     POCT urinalysis dipstick [[id number]]  (Normal) Resulted:  07/27/19 0750    Lab Status:  Final result Specimen:  Urine Updated:  07/27/19 0750     Color, UA see results    CBC and differential [[id number]] Collected:  07/27/19 0736    Lab Status:  Final result Specimen:  Blood from Arm, Left Updated:  07/27/19 0749     WBC 4 81 Thousand/uL      RBC 4 78 Million/uL      Hemoglobin 14 4 g/dL      Hematocrit 42 7 %      MCV 89 fL      MCH 30 1 pg      MCHC 33 7 g/dL      RDW 12 2 %      MPV 10 8 fL      Platelets 334 Thousands/uL      nRBC 0 /100 WBCs Neutrophils Relative 55 %      Immat GRANS % 0 %      Lymphocytes Relative 29 %      Monocytes Relative 10 %      Eosinophils Relative 5 %      Basophils Relative 1 %      Neutrophils Absolute 2 64 Thousands/µL      Immature Grans Absolute 0 01 Thousand/uL      Lymphocytes Absolute 1 40 Thousands/µL      Monocytes Absolute 0 47 Thousand/µL      Eosinophils Absolute 0 26 Thousand/µL      Basophils Absolute 0 03 Thousands/µL     ED Urine Macroscopic [[id number]]  (Abnormal) Collected:  07/27/19 0753    Lab Status:  Final result Specimen:  Urine Updated:  07/27/19 0748     Color, UA Yellow     Clarity, UA Clear     pH, UA 6 0     Leukocytes, UA Negative     Nitrite, UA Negative     Protein, UA Negative mg/dl      Glucose, UA Negative mg/dl      Ketones, UA 15 (1+) mg/dl      Urobilinogen, UA 0 2 E U /dl      Bilirubin, UA Negative     Blood, UA Negative     Specific Gravity, UA 1 020    Narrative:       CLINITEK RESULT                 CT abdomen pelvis with contrast   Final Result by Keyshawn Ramirez DO (07/27 8609)   1  No acute abdominal pelvic abnormality  2   Colonic diverticulosis  3   Mild circumferential bladder wall thickening, likely due to under distention  Correlate for mild cystitis versus mild bladder outlet obstruction from prostatic hypertrophy  Workstation performed: [id number]               Procedures  Procedures        ED Course                               MDM  Number of Diagnoses or Management Options  Abdominal pain: new and requires workup  Diverticulosis of colon: new and requires workup  Nausea vomiting and diarrhea: new and requires workup  Diagnosis management comments: 61-year-old man presents with 5 days of abdominal pain with associated nausea vomiting and diarrhea  On exam patient has periumbilical tenderness to palpation exam is otherwise benign  Bedside ultrasound of right upper quadrant within normal limits    Will check labs including lipase, CT abdomen pelvis with IV contrast to assess for intra-abdominal process  Reassess  Lab workup unremarkable  CT scan shows mild scattered diverticulosis of sigmoid colon  Symptoms likely from gastroenteritis  Discharge home  Follow up with PCP  Return precautions discussed       Amount and/or Complexity of Data Reviewed  Clinical lab tests: ordered and reviewed  Tests in the radiology section of CPT®: ordered and reviewed  Decide to obtain previous medical records or to obtain history from someone other than the patient: yes  Obtain history from someone other than the patient: yes  Review and summarize past medical records: yes  Discuss the patient with other providers: yes  Independent visualization of images, tracings, or specimens: yes    Risk of Complications, Morbidity, and/or Mortality  Presenting problems: low  Diagnostic procedures: low  Management options: low    Patient Progress  Patient progress: stable      Disposition  Final diagnoses:   Abdominal pain   Nausea vomiting and diarrhea   Diverticulosis of colon     Time reflects when diagnosis was documented in both MDM as applicable and the Disposition within this note     Time User Action Codes Description Comment    7/27/2019  9:32 AM Alferd Muse Add [R10 9] Abdominal pain     7/27/2019  9:32 AM Alferd Muse Add [R11 2,  R19 7] Nausea vomiting and diarrhea     7/27/2019  9:33 AM Alferd Muse Add [K57 30] Diverticulosis of colon       ED Disposition     ED Disposition Condition Date/Time Comment    Discharge Stable Sat Jul 27, 2019  9:32 AM Florentin Mckeon discharge to home/self care  Follow-up Information     Follow up With Specialties Details Why Contact Oksana Rangel DO Internal Medicine   [address]            There are no discharge medications for this patient  No discharge procedures on file  ED Provider  Attending physically available and evaluated Florentin Mckeon   I managed the patient along with the ED Attending      Electronically Signed by         Marci Baird MD  07/27/19 8990

## 2019-08-24 ENCOUNTER — HOSPITAL ENCOUNTER (OUTPATIENT)
Dept: RADIOLOGY | Facility: HOSPITAL | Age: 39
Discharge: HOME | End: 2019-08-24
Payer: COMMERCIAL

## 2019-08-24 DIAGNOSIS — M79.671 FOOT PAIN, RIGHT: Primary | ICD-10-CM

## 2019-08-24 DIAGNOSIS — M79.671 FOOT PAIN, RIGHT: ICD-10-CM

## 2019-08-24 PROCEDURE — 73630 X-RAY EXAM OF FOOT: CPT

## 2019-08-24 RX ORDER — COLCHICINE 0.6 MG/1
TABLET ORAL
Qty: 7 TABLET | Refills: 0 | Status: SHIPPED | OUTPATIENT
Start: 2019-08-24 | End: 2020-08-13

## 2020-08-13 ENCOUNTER — OFFICE VISIT (OUTPATIENT)
Dept: OBGYN CLINIC | Facility: OTHER | Age: 40
End: 2020-08-13
Payer: COMMERCIAL

## 2020-08-13 ENCOUNTER — APPOINTMENT (OUTPATIENT)
Dept: RADIOLOGY | Facility: OTHER | Age: 40
End: 2020-08-13
Payer: COMMERCIAL

## 2020-08-13 VITALS
HEART RATE: 54 BPM | DIASTOLIC BLOOD PRESSURE: 78 MMHG | SYSTOLIC BLOOD PRESSURE: 118 MMHG | HEIGHT: 69 IN | WEIGHT: 176 LBS | BODY MASS INDEX: 26.07 KG/M2

## 2020-08-13 DIAGNOSIS — M25.511 RIGHT SHOULDER PAIN, UNSPECIFIED CHRONICITY: ICD-10-CM

## 2020-08-13 DIAGNOSIS — M24.811 INTERNAL DERANGEMENT OF RIGHT SHOULDER: Primary | ICD-10-CM

## 2020-08-13 PROCEDURE — 99243 OFF/OP CNSLTJ NEW/EST LOW 30: CPT | Performed by: ORTHOPAEDIC SURGERY

## 2020-08-13 PROCEDURE — 73030 X-RAY EXAM OF SHOULDER: CPT

## 2020-08-13 NOTE — PROGRESS NOTES
Assessment  Diagnoses and all orders for this visit:    Internal derangement of right shoulder      Discussion and Plan:    · Explained to the patient that due to increased pain about his right shoulder for the past 4 weeks and his history of a SLAP tear in 2012 a new MRI Arthrogram will be warranted to further evaluate the labrum as well as the rotator cuff and other internal structures  He was in agreement with this treatment plan and wished to proceed  · May perform activities as tolerated  Avoid painful maneuvers  · Follow up after MRI arthrogram for discussion of results and further treatment options based on these results  Subjective:   Patient ID: Maye Malik is a 41 y o  male      The patient presents with a chief complaint of right shoulder pain  The pain began 4 week(s) ago and is not associated with an acute injury  Patient was seen in 2012 for a SLAP tear and improved with non operative treatments  The patient describes the pain as aching and dull in intensity,  intermittent, occurring with increasing frequency in timing, and localizes the pain to the  right glenohumeral joint, biceps tendon  The pain is worse with overuse, raising arm over head and cross arm and abduction manuevers and relieved by rest, ice, avoiding the painful activities  The pain is not associated with numbness and tingling  The pain is not associated with constitutional symptoms  The patient is awoken at night by the pain  The patient has had prior treatment in the form of HEP  The following portions of the patient's history were reviewed and updated as appropriate: allergies, current medications, past family history, past medical history, past social history, past surgical history and problem list     Review of Systems   Constitutional: Negative for chills, fatigue, fever and unexpected weight change  HENT: Negative for hearing loss, nosebleeds and sore throat      Eyes: Negative for pain, redness and visual disturbance  Respiratory: Negative for cough, shortness of breath and wheezing  Cardiovascular: Negative for chest pain, palpitations and leg swelling  Gastrointestinal: Negative for abdominal pain, nausea and vomiting  Endocrine: Negative for polydipsia and polyuria  Genitourinary: Negative for frequency and urgency  Skin: Negative for color change, rash and wound  Neurological: Negative for dizziness, weakness, numbness and headaches  Psychiatric/Behavioral: Negative for behavioral problems, self-injury and suicidal ideas  Objective:  /78   Pulse (!) 54   Ht 5' 9" (1 753 m)   Wt 79 8 kg (176 lb) Comment: verbal  BMI 25 99 kg/m²       Right Shoulder Exam     Tenderness   The patient is experiencing no tenderness  Range of Motion   External rotation: 60   Forward flexion: 160   Internal rotation 0 degrees: Lumbar     Muscle Strength   Abduction: 5/5   External rotation: 5/5     Tests   Cross arm: positive  Drop arm: negative    Other   Erythema: absent  Sensation: normal  Pulse: present    Comments:  (+) Speed's Test  (+) Bangor's Test            Physical Exam  Constitutional:       General: He is not in acute distress  Appearance: He is well-developed  Eyes:      Conjunctiva/sclera: Conjunctivae normal       Pupils: Pupils are equal, round, and reactive to light  Neck:      Musculoskeletal: Normal range of motion and neck supple  Cardiovascular:      Rate and Rhythm: Normal rate and regular rhythm  Pulmonary:      Effort: Pulmonary effort is normal       Breath sounds: Normal breath sounds  Abdominal:      General: Bowel sounds are normal       Palpations: Abdomen is soft  Skin:     General: Skin is warm and dry  Findings: No erythema or rash  Neurological:      Mental Status: He is alert and oriented to person, place, and time  Deep Tendon Reflexes: Reflexes are normal and symmetric     Psychiatric:         Behavior: Behavior normal  I have personally reviewed pertinent films in PACS and my interpretation is as follows  X Ray Right Shoulder: No acute osseous abnormality or degenerative changes      Scribe Attestation    I,:   Liane Pinto am acting as a scribe while in the presence of the attending physician :        I,:   Olivia Figueroa MD personally performed the services described in this documentation    as scribed in my presence :

## 2020-08-26 ENCOUNTER — HOSPITAL ENCOUNTER (OUTPATIENT)
Dept: RADIOLOGY | Facility: HOSPITAL | Age: 40
Discharge: HOME | End: 2020-08-26
Attending: ORTHOPAEDIC SURGERY

## 2020-12-21 ENCOUNTER — IMMUNIZATIONS (OUTPATIENT)
Dept: FAMILY MEDICINE CLINIC | Facility: HOSPITAL | Age: 40
End: 2020-12-21
Payer: COMMERCIAL

## 2020-12-21 DIAGNOSIS — Z23 ENCOUNTER FOR IMMUNIZATION: ICD-10-CM

## 2020-12-21 PROCEDURE — 91300 SARS-COV-2 / COVID-19 MRNA VACCINE (PFIZER-BIONTECH) 30 MCG: CPT

## 2020-12-21 PROCEDURE — 0001A SARS-COV-2 / COVID-19 MRNA VACCINE (PFIZER-BIONTECH) 30 MCG: CPT

## 2021-01-12 ENCOUNTER — IMMUNIZATIONS (OUTPATIENT)
Dept: FAMILY MEDICINE CLINIC | Facility: HOSPITAL | Age: 41
End: 2021-01-12

## 2021-01-12 DIAGNOSIS — Z23 ENCOUNTER FOR IMMUNIZATION: ICD-10-CM

## 2021-01-12 PROCEDURE — 0002A SARS-COV-2 / COVID-19 MRNA VACCINE (PFIZER-BIONTECH) 30 MCG: CPT

## 2021-01-12 PROCEDURE — 91300 SARS-COV-2 / COVID-19 MRNA VACCINE (PFIZER-BIONTECH) 30 MCG: CPT

## 2021-10-07 ENCOUNTER — APPOINTMENT (OUTPATIENT)
Dept: RADIOLOGY | Facility: OTHER | Age: 41
End: 2021-10-07
Payer: COMMERCIAL

## 2021-10-07 ENCOUNTER — OFFICE VISIT (OUTPATIENT)
Dept: OBGYN CLINIC | Facility: OTHER | Age: 41
End: 2021-10-07
Payer: COMMERCIAL

## 2021-10-07 DIAGNOSIS — M24.812 INTERNAL DERANGEMENT OF SHOULDER, LEFT: ICD-10-CM

## 2021-10-07 DIAGNOSIS — M54.2 CERVICAL PAIN (NECK): ICD-10-CM

## 2021-10-07 DIAGNOSIS — M25.512 ACUTE PAIN OF LEFT SHOULDER: Primary | ICD-10-CM

## 2021-10-07 DIAGNOSIS — M25.512 LEFT SHOULDER PAIN, UNSPECIFIED CHRONICITY: ICD-10-CM

## 2021-10-07 PROCEDURE — 99214 OFFICE O/P EST MOD 30 MIN: CPT | Performed by: ORTHOPAEDIC SURGERY

## 2021-10-10 ENCOUNTER — HOSPITAL ENCOUNTER (OUTPATIENT)
Dept: MRI IMAGING | Facility: HOSPITAL | Age: 41
Discharge: HOME | End: 2021-10-10
Attending: ORTHOPAEDIC SURGERY
Payer: COMMERCIAL

## 2021-10-10 DIAGNOSIS — M24.812 INTERNAL DERANGEMENT OF SHOULDER, LEFT: ICD-10-CM

## 2021-10-10 DIAGNOSIS — M25.512 ACUTE PAIN OF LEFT SHOULDER: ICD-10-CM

## 2021-10-10 PROCEDURE — G1004 CDSM NDSC: HCPCS

## 2021-10-10 PROCEDURE — 73221 MRI JOINT UPR EXTREM W/O DYE: CPT

## 2021-11-01 ENCOUNTER — HOSPITAL ENCOUNTER (OUTPATIENT)
Dept: RADIOLOGY | Facility: HOSPITAL | Age: 41
Discharge: HOME | End: 2021-11-01
Payer: COMMERCIAL

## 2021-11-01 DIAGNOSIS — M79.672 FOOT PAIN, LEFT: ICD-10-CM

## 2021-11-01 PROCEDURE — 73630 X-RAY EXAM OF FOOT: CPT

## 2021-11-29 ENCOUNTER — OFFICE VISIT (OUTPATIENT)
Dept: OBGYN CLINIC | Facility: OTHER | Age: 41
End: 2021-11-29
Payer: COMMERCIAL

## 2021-11-29 VITALS
HEIGHT: 69 IN | SYSTOLIC BLOOD PRESSURE: 114 MMHG | HEART RATE: 55 BPM | DIASTOLIC BLOOD PRESSURE: 72 MMHG | WEIGHT: 185 LBS | BODY MASS INDEX: 27.4 KG/M2

## 2021-11-29 DIAGNOSIS — M75.102 TEAR OF LEFT SUPRASPINATUS TENDON: Primary | ICD-10-CM

## 2021-11-29 PROCEDURE — 20610 DRAIN/INJ JOINT/BURSA W/O US: CPT | Performed by: ORTHOPAEDIC SURGERY

## 2021-11-29 PROCEDURE — 99214 OFFICE O/P EST MOD 30 MIN: CPT | Performed by: ORTHOPAEDIC SURGERY

## 2021-11-29 RX ORDER — AMOXICILLIN 500 MG/1
CAPSULE ORAL
COMMUNITY
Start: 2021-10-17 | End: 2021-12-02

## 2021-11-29 RX ORDER — BETAMETHASONE SODIUM PHOSPHATE AND BETAMETHASONE ACETATE 3; 3 MG/ML; MG/ML
6 INJECTION, SUSPENSION INTRA-ARTICULAR; INTRALESIONAL; INTRAMUSCULAR; SOFT TISSUE
Status: COMPLETED | OUTPATIENT
Start: 2021-11-29 | End: 2021-11-29

## 2021-11-29 RX ORDER — BUPIVACAINE HYDROCHLORIDE 2.5 MG/ML
2 INJECTION, SOLUTION INFILTRATION; PERINEURAL
Status: COMPLETED | OUTPATIENT
Start: 2021-11-29 | End: 2021-11-29

## 2021-11-29 RX ADMIN — BUPIVACAINE HYDROCHLORIDE 2 ML: 2.5 INJECTION, SOLUTION INFILTRATION; PERINEURAL at 09:40

## 2021-11-29 RX ADMIN — BETAMETHASONE SODIUM PHOSPHATE AND BETAMETHASONE ACETATE 6 MG: 3; 3 INJECTION, SUSPENSION INTRA-ARTICULAR; INTRALESIONAL; INTRAMUSCULAR; SOFT TISSUE at 09:40

## 2021-12-01 ENCOUNTER — OFFICE VISIT (OUTPATIENT)
Dept: PODIATRY | Facility: CLINIC | Age: 41
End: 2021-12-01
Payer: COMMERCIAL

## 2021-12-01 VITALS
BODY MASS INDEX: 27.76 KG/M2 | DIASTOLIC BLOOD PRESSURE: 72 MMHG | SYSTOLIC BLOOD PRESSURE: 114 MMHG | WEIGHT: 188 LBS | HEART RATE: 63 BPM

## 2021-12-01 DIAGNOSIS — M76.72 PERONEAL TENDONITIS, LEFT: ICD-10-CM

## 2021-12-01 DIAGNOSIS — M72.2 PLANTAR FASCIITIS, LEFT: Primary | ICD-10-CM

## 2021-12-01 PROCEDURE — 99213 OFFICE O/P EST LOW 20 MIN: CPT | Performed by: PODIATRIST

## 2021-12-01 PROCEDURE — 20550 NJX 1 TENDON SHEATH/LIGAMENT: CPT | Performed by: PODIATRIST

## 2021-12-02 ENCOUNTER — OFFICE VISIT (OUTPATIENT)
Dept: FAMILY MEDICINE CLINIC | Facility: CLINIC | Age: 41
End: 2021-12-02
Payer: COMMERCIAL

## 2021-12-02 VITALS
SYSTOLIC BLOOD PRESSURE: 102 MMHG | HEART RATE: 49 BPM | HEIGHT: 69 IN | DIASTOLIC BLOOD PRESSURE: 64 MMHG | WEIGHT: 187 LBS | BODY MASS INDEX: 27.7 KG/M2 | OXYGEN SATURATION: 98 %

## 2021-12-02 DIAGNOSIS — Z13.220 SCREENING FOR LIPID DISORDERS: ICD-10-CM

## 2021-12-02 DIAGNOSIS — Z13.1 DIABETES MELLITUS SCREENING: ICD-10-CM

## 2021-12-02 DIAGNOSIS — Z20.822 OCCUPATIONAL EXPOSURE TO COVID-19 VIRUS: ICD-10-CM

## 2021-12-02 DIAGNOSIS — Z00.00 PHYSICAL EXAM, ANNUAL: Primary | ICD-10-CM

## 2021-12-02 DIAGNOSIS — Z11.59 NEED FOR HEPATITIS C SCREENING TEST: ICD-10-CM

## 2021-12-02 DIAGNOSIS — Z11.4 ENCOUNTER FOR SCREENING FOR HIV: ICD-10-CM

## 2021-12-02 PROBLEM — Z77.21 EXPOSURE TO BLOOD OR BODY FLUID: Status: ACTIVE | Noted: 2021-12-02

## 2021-12-02 PROBLEM — J30.2 SEASONAL ALLERGIES: Status: ACTIVE | Noted: 2021-12-02

## 2021-12-02 PROCEDURE — 99396 PREV VISIT EST AGE 40-64: CPT | Performed by: FAMILY MEDICINE

## 2021-12-07 ENCOUNTER — EVALUATION (OUTPATIENT)
Dept: PHYSICAL THERAPY | Facility: OTHER | Age: 41
End: 2021-12-07
Payer: COMMERCIAL

## 2021-12-07 DIAGNOSIS — M75.102 TEAR OF LEFT SUPRASPINATUS TENDON: ICD-10-CM

## 2021-12-07 PROCEDURE — 97112 NEUROMUSCULAR REEDUCATION: CPT | Performed by: PHYSICAL THERAPIST

## 2021-12-07 PROCEDURE — 97161 PT EVAL LOW COMPLEX 20 MIN: CPT | Performed by: PHYSICAL THERAPIST

## 2021-12-07 PROCEDURE — 97140 MANUAL THERAPY 1/> REGIONS: CPT | Performed by: PHYSICAL THERAPIST

## 2021-12-13 ENCOUNTER — OFFICE VISIT (OUTPATIENT)
Dept: PHYSICAL THERAPY | Facility: OTHER | Age: 41
End: 2021-12-13
Payer: COMMERCIAL

## 2021-12-13 DIAGNOSIS — M75.102 TEAR OF LEFT SUPRASPINATUS TENDON: Primary | ICD-10-CM

## 2021-12-13 PROCEDURE — 97110 THERAPEUTIC EXERCISES: CPT | Performed by: PHYSICAL THERAPIST

## 2021-12-13 PROCEDURE — 97112 NEUROMUSCULAR REEDUCATION: CPT | Performed by: PHYSICAL THERAPIST

## 2021-12-17 ENCOUNTER — APPOINTMENT (OUTPATIENT)
Dept: LAB | Facility: CLINIC | Age: 41
End: 2021-12-17
Payer: COMMERCIAL

## 2021-12-17 DIAGNOSIS — Z13.1 DIABETES MELLITUS SCREENING: ICD-10-CM

## 2021-12-17 DIAGNOSIS — Z11.4 ENCOUNTER FOR SCREENING FOR HIV: ICD-10-CM

## 2021-12-17 DIAGNOSIS — Z00.00 PHYSICAL EXAM, ANNUAL: ICD-10-CM

## 2021-12-17 DIAGNOSIS — Z20.822 OCCUPATIONAL EXPOSURE TO COVID-19 VIRUS: ICD-10-CM

## 2021-12-17 DIAGNOSIS — Z11.59 NEED FOR HEPATITIS C SCREENING TEST: ICD-10-CM

## 2021-12-17 LAB
ALBUMIN SERPL BCP-MCNC: 4.5 G/DL (ref 3.5–5)
ALP SERPL-CCNC: 83 U/L (ref 46–116)
ALT SERPL W P-5'-P-CCNC: 49 U/L (ref 12–78)
ANION GAP SERPL CALCULATED.3IONS-SCNC: 5 MMOL/L (ref 4–13)
AST SERPL W P-5'-P-CCNC: 22 U/L (ref 5–45)
BASOPHILS # BLD AUTO: 0.04 THOUSANDS/ΜL (ref 0–0.1)
BASOPHILS NFR BLD AUTO: 1 % (ref 0–1)
BILIRUB SERPL-MCNC: 1.58 MG/DL (ref 0.2–1)
BUN SERPL-MCNC: 15 MG/DL (ref 5–25)
CALCIUM SERPL-MCNC: 10.2 MG/DL (ref 8.3–10.1)
CHLORIDE SERPL-SCNC: 102 MMOL/L (ref 100–108)
CO2 SERPL-SCNC: 31 MMOL/L (ref 21–32)
CREAT SERPL-MCNC: 1.04 MG/DL (ref 0.6–1.3)
EOSINOPHIL # BLD AUTO: 0.22 THOUSAND/ΜL (ref 0–0.61)
EOSINOPHIL NFR BLD AUTO: 4 % (ref 0–6)
ERYTHROCYTE [DISTWIDTH] IN BLOOD BY AUTOMATED COUNT: 12.9 % (ref 11.6–15.1)
EST. AVERAGE GLUCOSE BLD GHB EST-MCNC: 97 MG/DL
GFR SERPL CREATININE-BSD FRML MDRD: 87 ML/MIN/1.73SQ M
GLUCOSE SERPL-MCNC: 95 MG/DL (ref 65–140)
HBA1C MFR BLD: 5 %
HCT VFR BLD AUTO: 47.8 % (ref 36.5–49.3)
HCV AB SER QL: NORMAL
HGB BLD-MCNC: 15.8 G/DL (ref 12–17)
IMM GRANULOCYTES # BLD AUTO: 0.01 THOUSAND/UL (ref 0–0.2)
IMM GRANULOCYTES NFR BLD AUTO: 0 % (ref 0–2)
LYMPHOCYTES # BLD AUTO: 1.75 THOUSANDS/ΜL (ref 0.6–4.47)
LYMPHOCYTES NFR BLD AUTO: 31 % (ref 14–44)
MCH RBC QN AUTO: 30.2 PG (ref 26.8–34.3)
MCHC RBC AUTO-ENTMCNC: 33.1 G/DL (ref 31.4–37.4)
MCV RBC AUTO: 91 FL (ref 82–98)
MONOCYTES # BLD AUTO: 0.45 THOUSAND/ΜL (ref 0.17–1.22)
MONOCYTES NFR BLD AUTO: 8 % (ref 4–12)
NEUTROPHILS # BLD AUTO: 3.13 THOUSANDS/ΜL (ref 1.85–7.62)
NEUTS SEG NFR BLD AUTO: 56 % (ref 43–75)
NRBC BLD AUTO-RTO: 0 /100 WBCS
PLATELET # BLD AUTO: 259 THOUSANDS/UL (ref 149–390)
PMV BLD AUTO: 10.4 FL (ref 8.9–12.7)
POTASSIUM SERPL-SCNC: 4 MMOL/L (ref 3.5–5.3)
PROT SERPL-MCNC: 8.3 G/DL (ref 6.4–8.2)
RBC # BLD AUTO: 5.24 MILLION/UL (ref 3.88–5.62)
SARS-COV-2 IGG SERPL QL IA: REACTIVE
SARS-COV-2 IGG+IGM SERPL QL IA: REACTIVE
SODIUM SERPL-SCNC: 138 MMOL/L (ref 136–145)
TSH SERPL DL<=0.05 MIU/L-ACNC: 1.25 UIU/ML (ref 0.36–3.74)
WBC # BLD AUTO: 5.6 THOUSAND/UL (ref 4.31–10.16)

## 2021-12-17 PROCEDURE — 84443 ASSAY THYROID STIM HORMONE: CPT

## 2021-12-17 PROCEDURE — 86769 SARS-COV-2 COVID-19 ANTIBODY: CPT

## 2021-12-17 PROCEDURE — 87389 HIV-1 AG W/HIV-1&-2 AB AG IA: CPT

## 2021-12-17 PROCEDURE — 80053 COMPREHEN METABOLIC PANEL: CPT

## 2021-12-17 PROCEDURE — 86803 HEPATITIS C AB TEST: CPT

## 2021-12-17 PROCEDURE — 36415 COLL VENOUS BLD VENIPUNCTURE: CPT

## 2021-12-17 PROCEDURE — 85025 COMPLETE CBC W/AUTO DIFF WBC: CPT

## 2021-12-17 PROCEDURE — 83036 HEMOGLOBIN GLYCOSYLATED A1C: CPT

## 2021-12-18 LAB — HIV 1+2 AB+HIV1 P24 AG SERPL QL IA: NORMAL

## 2021-12-22 ENCOUNTER — APPOINTMENT (OUTPATIENT)
Dept: PHYSICAL THERAPY | Facility: OTHER | Age: 41
End: 2021-12-22
Payer: COMMERCIAL

## 2022-01-08 ENCOUNTER — IMMUNIZATIONS (OUTPATIENT)
Dept: FAMILY MEDICINE CLINIC | Facility: HOSPITAL | Age: 42
End: 2022-01-08

## 2022-01-08 DIAGNOSIS — Z23 ENCOUNTER FOR IMMUNIZATION: Primary | ICD-10-CM

## 2022-01-08 PROCEDURE — 91300 COVID-19 PFIZER VACC 0.3 ML: CPT

## 2022-01-08 PROCEDURE — 0001A COVID-19 PFIZER VACC 0.3 ML: CPT

## 2022-02-11 ENCOUNTER — TELEPHONE (OUTPATIENT)
Dept: OBGYN CLINIC | Facility: OTHER | Age: 42
End: 2022-02-11

## 2022-02-24 ENCOUNTER — APPOINTMENT (OUTPATIENT)
Dept: RADIOLOGY | Facility: MEDICAL CENTER | Age: 42
End: 2022-02-24
Payer: COMMERCIAL

## 2022-02-24 DIAGNOSIS — R05.9 COUGH: ICD-10-CM

## 2022-02-24 DIAGNOSIS — J20.8 ACUTE BRONCHITIS DUE TO OTHER SPECIFIED ORGANISMS: ICD-10-CM

## 2022-02-24 PROCEDURE — 71046 X-RAY EXAM CHEST 2 VIEWS: CPT

## 2022-07-07 DIAGNOSIS — Z13.220 SCREENING FOR LIPID DISORDERS: Primary | ICD-10-CM

## 2022-09-01 ENCOUNTER — TELEPHONE (OUTPATIENT)
Dept: PODIATRY | Facility: CLINIC | Age: 42
End: 2022-09-01

## 2022-09-04 DIAGNOSIS — M76.72 PERONEAL TENDONITIS, LEFT: ICD-10-CM

## 2022-09-04 DIAGNOSIS — M72.2 PLANTAR FASCIITIS, LEFT: Primary | ICD-10-CM

## 2022-09-08 ENCOUNTER — TELEPHONE (OUTPATIENT)
Dept: PODIATRY | Facility: CLINIC | Age: 42
End: 2022-09-08

## 2022-10-11 PROBLEM — R05.9 COUGH: Status: RESOLVED | Noted: 2022-02-24 | Resolved: 2022-10-11

## 2022-10-11 PROBLEM — J20.8 ACUTE BRONCHITIS DUE TO OTHER SPECIFIED ORGANISMS: Status: RESOLVED | Noted: 2022-02-24 | Resolved: 2022-10-11

## 2022-10-12 PROBLEM — Z13.220 SCREENING FOR LIPID DISORDERS: Status: RESOLVED | Noted: 2021-12-02 | Resolved: 2022-10-12

## 2022-10-12 PROBLEM — Z11.59 NEED FOR HEPATITIS C SCREENING TEST: Status: RESOLVED | Noted: 2021-12-02 | Resolved: 2022-10-12

## 2022-10-12 PROBLEM — Z13.1 DIABETES MELLITUS SCREENING: Status: RESOLVED | Noted: 2021-12-02 | Resolved: 2022-10-12

## 2022-10-26 ENCOUNTER — TELEPHONE (OUTPATIENT)
Dept: PODIATRY | Facility: CLINIC | Age: 42
End: 2022-10-26

## 2022-10-26 ENCOUNTER — PROCEDURE VISIT (OUTPATIENT)
Dept: PODIATRY | Facility: CLINIC | Age: 42
End: 2022-10-26

## 2022-10-26 VITALS
DIASTOLIC BLOOD PRESSURE: 72 MMHG | BODY MASS INDEX: 27.62 KG/M2 | HEART RATE: 67 BPM | SYSTOLIC BLOOD PRESSURE: 124 MMHG | HEIGHT: 69 IN

## 2022-10-26 DIAGNOSIS — M72.2 PLANTAR FASCIITIS, LEFT: Primary | ICD-10-CM

## 2022-10-26 RX ORDER — AMOXICILLIN 500 MG/1
CAPSULE ORAL
COMMUNITY
Start: 2022-08-04

## 2022-10-31 NOTE — PROGRESS NOTES
This patient was seen on 10/26/22  My role is Foot , Ankle, and Wound Specialist    SUBJECTIVE    Chief Complaint:  Foot pain     Patient ID: Mabel Puentes is a 38 y o  male  Jamel Shaffer is here with Left foot pain  He's here today in the office for the purpose of obtaining casts to construct custom molded foot orthotics  His main activity is trail running  He's had peroneal tendonitis and fasciitis in the past  He has a history of pronation  He's been wearing some orthotics that a friend make him and these help somewhat  I had given him an injection back in December which did ultimately help him; albeit after a brief but intense sterile flare  The following portions of the patient's history were reviewed and updated as appropriate: allergies, current medications, past family history, past medical history, past social history, past surgical history and problem list     Review of Systems   Constitutional: Positive for activity change  Negative for appetite change, chills, diaphoresis, fatigue, fever and unexpected weight change  Respiratory: Negative  Cardiovascular: Negative  Musculoskeletal: Positive for arthralgias, gait problem and myalgias  OBJECTIVE      /72   Pulse 67   Ht 5' 9" (1 753 m)   BMI 27 62 kg/m²     Foot/Ankle Musculoskeletal Exam    General      Neurological: alert      General additional comments:  I note muscle function of the anterior, posterior, evertor and invertor muscle groups of the lower leg are intact and normal  I note ROM of the ankle joint, subtalar joint, Choparts and Lisfranc's joint complexes and metatarsophalangeal joints are WNL without crepitus nor restrictions bilaterally  I note in stance he hyperpronates bilaterally  I note some pain on deep palpation of the central plantar fascia band at it's origin on the calcaneous  I note also  Minimal pain at the peroneal brevis insertion on the styloid process          Physical Exam  Vitals and nursing note reviewed  Constitutional:       General: He is not in acute distress  Appearance: Normal appearance  He is normal weight  He is not ill-appearing, toxic-appearing or diaphoretic  HENT:      Head: Normocephalic and atraumatic  Cardiovascular:      Rate and Rhythm: Normal rate  Pulses: Normal pulses  Pulmonary:      Effort: Pulmonary effort is normal    Musculoskeletal:      Comments:  I note muscle function of the anterior, posterior, evertor and invertor muscle groups of the lower leg are intact and normal  I note ROM of the ankle joint, subtalar joint, Choparts and Lisfranc's joint complexes and metatarsophalangeal joints are WNL without crepitus nor restrictions bilaterally  I note in stance he hyperpronates bilaterally  I note some pain on deep palpation of the central plantar fascia band at it's origin on the calcaneous  I note also  Minimal pain at the peroneal brevis insertion on the styloid process  Skin:     Capillary Refill: Capillary refill takes less than 2 seconds  Neurological:      Mental Status: He is alert  ASSESSMENT     Diagnoses and all orders for this visit:    Plantar fasciitis, left    Other orders  -     amoxicillin (AMOXIL) 500 mg capsule;  (Patient not taking: Reported on 10/26/2022)         Problem List Items Addressed This Visit        Musculoskeletal and Integument    Plantar fasciitis, left - Primary              PLAN    I do feel ultimately a custom, rigid foot orthotic would benefit him  The devices he has now are decent (semirigid to semiflexible polypropylene half length orthotics with a topcover and an external heel post)  The patient was placed in the supine position  Plaster negative mold impressions were caputured of both feet with the the subtalar joints held in neutral position and the midtarsal joints maximally pronated and locked       The negative molds, when properly hardened, were removed from the patient and sent with prescription instructions to the orthotic laboratory       The patient will be notified when orthotics are available for dispensing at the office

## 2022-11-15 ENCOUNTER — TELEPHONE (OUTPATIENT)
Dept: PODIATRY | Facility: CLINIC | Age: 42
End: 2022-11-15

## 2022-11-18 ENCOUNTER — DOCUMENTATION (OUTPATIENT)
Dept: FAMILY MEDICINE CLINIC | Facility: CLINIC | Age: 42
End: 2022-11-18

## 2022-11-18 DIAGNOSIS — J98.01 COUGH DUE TO BRONCHOSPASM: Primary | ICD-10-CM

## 2022-11-18 RX ORDER — ALBUTEROL SULFATE 90 UG/1
2 AEROSOL, METERED RESPIRATORY (INHALATION) EVERY 6 HOURS PRN
Qty: 18 G | Refills: 1 | Status: SHIPPED | OUTPATIENT
Start: 2022-11-18

## 2023-01-17 ENCOUNTER — PREPPED CHART (OUTPATIENT)
Dept: URBAN - METROPOLITAN AREA CLINIC 6 | Facility: CLINIC | Age: 43
End: 2023-01-17

## 2023-01-18 ENCOUNTER — PROBLEM (OUTPATIENT)
Dept: URBAN - METROPOLITAN AREA CLINIC 6 | Facility: CLINIC | Age: 43
End: 2023-01-18

## 2023-01-18 DIAGNOSIS — H11.042: ICD-10-CM

## 2023-01-18 DIAGNOSIS — H04.123: ICD-10-CM

## 2023-01-18 PROCEDURE — 92014 COMPRE OPH EXAM EST PT 1/>: CPT

## 2023-01-18 ASSESSMENT — VISUAL ACUITY
OD_PH: 20/50
OD_SC: 20/100
OS_SC: 20/20

## 2023-01-18 ASSESSMENT — TONOMETRY
OD_IOP_MMHG: 10
OS_IOP_MMHG: 9

## 2023-02-21 ENCOUNTER — HOSPITAL ENCOUNTER (OUTPATIENT)
Dept: RADIOLOGY | Facility: HOSPITAL | Age: 43
Discharge: HOME | End: 2023-02-21

## 2023-02-21 DIAGNOSIS — M25.572 LEFT ANKLE PAIN, UNSPECIFIED CHRONICITY: ICD-10-CM

## 2023-02-21 DIAGNOSIS — M25.572 LEFT ANKLE PAIN, UNSPECIFIED CHRONICITY: Primary | ICD-10-CM

## 2023-02-24 DIAGNOSIS — S93.402A MODERATE LEFT ANKLE SPRAIN, INITIAL ENCOUNTER: Primary | ICD-10-CM

## 2023-03-24 ENCOUNTER — TELEPHONE (OUTPATIENT)
Dept: PODIATRY | Facility: CLINIC | Age: 43
End: 2023-03-24

## 2023-03-24 NOTE — TELEPHONE ENCOUNTER
Caller: Ramiro Findvanessa      Reason for call: Would like an appt to  orthotics/sees Dr Tara Mejia    Call back#: [phone]

## 2023-03-27 NOTE — TELEPHONE ENCOUNTER
PPR appt scheduled for 3/28/23 to  his orthotics, but also for an evaluation of a left ankle injury

## 2023-03-28 ENCOUNTER — OFFICE VISIT (OUTPATIENT)
Dept: PODIATRY | Facility: CLINIC | Age: 43
End: 2023-03-28

## 2023-03-28 DIAGNOSIS — S93.402A MODERATE LEFT ANKLE SPRAIN, INITIAL ENCOUNTER: Primary | ICD-10-CM

## 2023-03-28 NOTE — PROGRESS NOTES
This patient was seen on 3/28/23  My role is Foot , Ankle, and Wound Specialist    SUBJECTIVE    Chief Complaint:  Left ankle sprain     Patient ID: Ronda Cheung is a 38 y o  male  Lucerobradley Arthur is here both to be fitted for his new custom foot orthotics as well as to treat a new ankle sprain  He relates he sprained his ankle (forced plantarflexion injury) in ice hockey skates  He subsequently went skiing and while the ski boot felt OK, side-to-side motion caused continued pain  He continues to play ice hockey albeit with modified lacing technique  He feels OK playing hockey but running is painful (pointing to the ATFL area)  The following portions of the patient's history were reviewed and updated as appropriate: allergies, current medications, past family history, past medical history, past social history, past surgical history and problem list     Review of Systems   Constitutional: Positive for activity change  Negative for appetite change, chills, diaphoresis, fatigue, fever and unexpected weight change  Respiratory: Negative  Cardiovascular: Positive for leg swelling  Musculoskeletal: Positive for arthralgias and gait problem  Negative for myalgias  OBJECTIVE      There were no vitals taken for this visit  Foot/Ankle Musculoskeletal Exam    General    Neurological: alert  General additional comments:  I note muscle function of the anterior, posterior, evertor and invertor muscle groups of the lower leg are intact and normal  I note ROM of the ankle joint, subtalar joint, Choparts and Lisfranc's joint complexes and metatarsophalangeal joints are WNL without crepitus nor restrictions bilaterally  I note in stance he hyperpronates bilaterally  I note some pain on deep palpation of the ATFL Left  No CFL pain  No anterior drawer  Physical Exam  Vitals and nursing note reviewed  Constitutional:       General: He is not in acute distress  Appearance: Normal appearance   He is normal weight  He is not ill-appearing, toxic-appearing or diaphoretic  HENT:      Head: Normocephalic and atraumatic  Cardiovascular:      Rate and Rhythm: Normal rate  Pulses: Normal pulses  Pulmonary:      Effort: Pulmonary effort is normal    Musculoskeletal:      Comments:  I note muscle function of the anterior, posterior, evertor and invertor muscle groups of the lower leg are intact and normal  I note ROM of the ankle joint, subtalar joint, Choparts and Lisfranc's joint complexes and metatarsophalangeal joints are WNL without crepitus nor restrictions bilaterally  I note in stance he hyperpronates bilaterally  I note some pain on deep palpation of the ATFL Left  No CFL pain  No anterior drawer  Skin:     Capillary Refill: Capillary refill takes less than 2 seconds  Neurological:      Mental Status: He is alert  ASSESSMENT     Diagnoses and all orders for this visit:    Moderate left ankle sprain, initial encounter         Problem List Items Addressed This Visit        Musculoskeletal and Integument    Moderate left ankle sprain - Primary           PLAN  I reviewed his ankle xrays dated 2/21/23 noting no fracture, no medial clear space  I feel he has a grade 2 ankle sprain  I discussed with him the benefits of PT (he already has a prescription for this)  If continued pain occurs we can consider MRI to rule out osteochondral lesion  Patients custom foot orthotics were dispensed  Patient was instructed to use them one hour tomorrow maximum  Increase use incrementally one hour per day until using them full time  If patient develops pain from use, reduce use by 50% and restart the incremental break-in period  We will see patient back in 3-4 weeks for adjustments as necessary

## 2023-05-04 ENCOUNTER — OFFICE VISIT (OUTPATIENT)
Dept: OBGYN CLINIC | Facility: OTHER | Age: 43
End: 2023-05-04

## 2023-05-04 ENCOUNTER — APPOINTMENT (OUTPATIENT)
Dept: RADIOLOGY | Facility: OTHER | Age: 43
End: 2023-05-04

## 2023-05-04 VITALS
BODY MASS INDEX: 27.62 KG/M2 | DIASTOLIC BLOOD PRESSURE: 76 MMHG | HEIGHT: 69 IN | SYSTOLIC BLOOD PRESSURE: 129 MMHG | HEART RATE: 53 BPM

## 2023-05-04 DIAGNOSIS — M75.102 TEAR OF LEFT SUPRASPINATUS TENDON: ICD-10-CM

## 2023-05-04 DIAGNOSIS — M25.512 ACUTE PAIN OF LEFT SHOULDER: ICD-10-CM

## 2023-05-04 DIAGNOSIS — M25.512 ACUTE PAIN OF LEFT SHOULDER: Primary | ICD-10-CM

## 2023-05-04 NOTE — PROGRESS NOTES
"  Assessment  Diagnoses and all orders for this visit:      Tear of left supraspinatus tendon          Discussion and Plan:    I reviewed with the patient that I have concern given his acute injury to the left shoulder playing hockey and his significant weakness when I isolate the rotator cuff that he may have progressed to his partial supraspinatus tear to a full-thickness supraspinatus tear  This has significant ramifications for whether we offer him surgical repair or not and I do recommend a new MRI to evaluate and show the current situation of his rotator cuff partial tear  Hopefully he just had a subluxation event from his posterior superior labral tear and as long as the rotator cuff looks unchanged then nonoperative care in the form of an injection and therapy can certainly be trialed  The patient does have a significant travel plan at the end of June so would like to avoid surgery if at all possible to after that trip so this will put our findings in the context when we get the MRI  Subjective:   Patient ID: Janice Walls is a 38 y o  male      HPI    Patient follows up with his left shoulder known partial-thickness supraspinatus tendon tear and posterior superior labral tear  He had been treated with nonoperative care including injection therapy and was doing well until a acute injury last week when he was diving to block a goal playing ice hockey he landed on an outstretched left shoulder had immediate pain and inability to elevate the arm  Symptoms have improved approximately 80% but he still is limited with weakness with abduction and using the arm overhead      The following portions of the patient's history were reviewed and updated as appropriate: allergies, current medications, past family history, past medical history, past social history, past surgical history and problem list       Objective:  /76   Pulse (!) 53   Ht 5' 9\" (1 753 m)   BMI 27 62 kg/m²       Left Shoulder Exam " Tenderness   Left shoulder tenderness location: Trapezius  Range of Motion   Active abduction: 90   External rotation: 70   Forward flexion: 170     Muscle Strength   Abduction: 3/5   Internal rotation: 4/5   External rotation: 4/5     Tests   Rivas test: positive  Impingement: positive  Drop arm: positive    Other   Erythema: absent  Scars: absent  Sensation: normal  Pulse: present                 I have personally reviewed pertinent films in PACS and my interpretation is as follows      Radiographs left shoulder show no evidence of fracture or dislocation, glenohumeral articulation preserved

## 2023-05-08 ENCOUNTER — HOSPITAL ENCOUNTER (OUTPATIENT)
Dept: MRI IMAGING | Facility: HOSPITAL | Age: 43
Discharge: HOME | End: 2023-05-08
Attending: ORTHOPAEDIC SURGERY

## 2023-05-08 DIAGNOSIS — M75.102 TEAR OF LEFT SUPRASPINATUS TENDON: ICD-10-CM

## 2023-05-15 ENCOUNTER — OFFICE VISIT (OUTPATIENT)
Dept: OBGYN CLINIC | Facility: OTHER | Age: 43
End: 2023-05-15

## 2023-05-15 VITALS
HEIGHT: 69 IN | DIASTOLIC BLOOD PRESSURE: 75 MMHG | HEART RATE: 55 BPM | BODY MASS INDEX: 26.51 KG/M2 | SYSTOLIC BLOOD PRESSURE: 130 MMHG | WEIGHT: 179 LBS

## 2023-05-15 DIAGNOSIS — M75.102 TEAR OF LEFT SUPRASPINATUS TENDON: Primary | ICD-10-CM

## 2023-05-15 NOTE — PATIENT INSTRUCTIONS
You are being scheduled for a shoulder arthroscopy to treat your symptoms  Below are some instructions and information on what to expect before and after your surgery  Pre-Surgical Preparation for Arthroscopic Shoulder Surgery: You will be contacted the evening prior to your surgery to confirm the scheduled time of the procedure and when to arrive at the hospital    Do not eat or drink anything after midnight the night before your surgery  Since you are having out-patient surgery, make sure that you have someone who can drive you home later in the day  Also, prepare that person for a long day, as the process of safely preparing for and recovering from the procedure is more time consuming than the actual procedure! As you will be in a sling after surgery, please wear or bring a loose fitting button-down shirt so that you can easily place this over the sling when you leave the surgical suite  This avoids having to place the operative arm in a sleeve  Most patients find that this is the easiest outfit to wear for the first week or so after surgery so you may want to plan accordingly  Most patients find that lying down in bed after shoulder surgery accentuates their discomfort  This is likely related to the effect of gravity on the swelling in the shoulder  As a result, most patients sleep better in a recliner or in bed with pillows propped up behind their back for the first few days or weeks after surgery  It is a good idea to plan for this ahead of time so there will be less hassle getting things set up the night after surgery  What to Expect After Arthroscopic Shoulder Surgery: It is normal to have swelling and discomfort in the shoulder for several days or a week after surgery  It is also normal to have a small amount of drainage from the surgical wounds (especially the first few days after surgery), as we put fluid into the shoulder to visualize the structures during surgery  "It is NOT normal to have foul smelling, purulent drainage and if this is noted, please contact the office immediately or proceed to the emergency room for evaluation as this may indicate an infection  Applying ice bags to the shoulder may help with pain that is not controlled by the regional block  Ice should be applied 20-30 minutes at a time, every hour or two  Make sure to put a thin towel or T-shirt next to your skin to avoid direct contact of the ice with the skin  Icing is most helpful in the first 48 hours, although many people find that continuing past this time frame lessens their postoperative pain  Please note that your post-operative dressing is not conductive to ice, so if you need to, it is okay to remove that dressing even the night after surgery and place band-aids over the wounds in order for the ice to take effect  Pain Control    Most patients will receive a nerve block, the local anesthetic may keep your whole arm numb for up to 4 days  You will be given a prescription for narcotic pain medication when you are discharged from the hospital   With the newer nerve block that is being utilized, patients are rarely requiring the use of this narcotic pain medication  If you find you do not tolerate that type of pain medicine well, call our office and we will try another one  In addition to the narcotic pain medication, it is safe to use an anti-inflammatory (unless the patient has a medical condition that would not allow safe use of this mediation)  This includes the Advil, Motrin, Ibuprofen and Alleve category of medications  Simply follow the over the counter dosing on the package and use as indicated as another adjunct  Importantly since these medications are all very similar, use only one of them  Tylenol is a separate medication that can be utilized as well and can be taken at the same time as the other medication or given in a \"staggered\" manner    Just make sure that you " follow the dosing on the over the counter bottle instructions  Also make sure that the pain medication prescribed by Dr Adryan Tucker team does not contain acetaminophen (this is found in Percocet and Vicodin)  Typically we do not prescribe those types of pain medications but if for some reason that has been prescribed DO NOT add more Tylenol (acetaminophen) as you could end up taking too much of that medication  As mentioned above, most patients find that lying down accentuates their discomfort  You might sleep better in a recliner, or propped up in bed  Dr Alfredo Weiss encourages patients to safely ambulate around the house as much as possible in the first few days after the procedure as this can help with blood circulation in the legs  While the incidence of blood clots is very rare following shoulder surgery, early ambulation is a great way to help decrease the already low rate  24 hours after the surgery you may remove the bandage and cover incisions with Band-Aids if needed  At that time you may shower, the wounds will have a surgical glue that will protect them from shower water but do not submerge your incisions directly (bathing or swimming) until at least 2 weeks post-operatively  It is safe to let the arm hang at the side and take a shower and put on a shirt without the sling on  Just make sure that you do not use the operative are to reach out and grab anything as that may damage the repair  When you are done showering and getting dressed please return the operative arm to the sling  Unless noted otherwise in your discharge paperwork, Dr Alfredo Weiss uses absorbable sutures so they do not need to be removed  Dr Hawa Moon physician assistant (PA) will see you in the office a few days after the procedure to review the intra-operative findings and to initiate physical therapy if appropriate    A post-operative appointment should have been scheduled for you already, but if for some reason this did not happen, please call the office to make one  Physical therapy is important after nearly all shoulder surgeries and a detailed rehabilitation plan based on the specific intra-operative findings and procedures will be provided to your therapist at the first post-operative office visit  Most patients have post-operative therapy appointments scheduled pre-operatively, but if you do not, that will be handled at the first post-operative office visit  Unless expressly directed otherwise it is safe to remove the sling even the first day after the surgery and let the arm hang by the side  This allows patients to shower and even put a shirt on (bad arm in the sleeve first)  It is also safe to flex and extend their wrist, hand and fingers as much as possible when the block wears off  These simple motions can serve to pump fluid out of the forearm and decrease swelling in the arm

## 2023-05-15 NOTE — PROGRESS NOTES
"  Assessment  Diagnoses and all orders for this visit:    Tear of left supraspinatus tendon      Discussion and Plan:    Discussed with the patient MRI shows progression of his previously known small supraspinatus tear  Explained I do recommend surgical intervention given his young age, high activity level and tendency for these traumatic tears to progress to irrepairable tears  Patient would like to wait until the fall to proceed with surgery  Explained If he would wait longer than 6 mos we would need to repeat the MRI  Follow up in July to schedule surgery for the fall  I do feel it is reasonable to wait unless the patient has worsening symptoms or a new injury, if that were to occur either new imaging will be obtained we will just proceed forward with repair  Subjective:   Patient ID: Ivet Rahman is a 38 y o  male      HPI  Patient presents today to discuss the findings of his left shoulder MRI  Patient had a known partial-thickness supraspinatus tendon tear and posterior superior labral tear  He was doing well until a recent ice hockey injury  The following portions of the patient's history were reviewed and updated as appropriate: allergies, current medications, past family history, past medical history, past social history, past surgical history and problem list       Objective:  /75 (BP Location: Right arm, Patient Position: Sitting, Cuff Size: Adult)   Pulse 55   Ht 5' 9\" (1 753 m)   Wt 81 2 kg (179 lb)   BMI 26 43 kg/m²         Left Shoulder Exam      Tenderness   Left shoulder tenderness location: Trapezius      Range of Motion   Active abduction: 90   External rotation: 70   Forward flexion: 170      Muscle Strength   Abduction: 3/5   Internal rotation: 4/5   External rotation: 4/5      Tests   Rivas test: positive  Impingement: positive  Drop arm: positive     Other   Erythema: absent  Scars: absent  Sensation: normal  Pulse: present       Physical Exam  Vitals reviewed   " Constitutional:       Appearance: He is well-developed  HENT:      Head: Normocephalic  Eyes:      Pupils: Pupils are equal, round, and reactive to light  Pulmonary:      Effort: Pulmonary effort is normal    Abdominal:      General: Abdomen is flat  There is no distension  Skin:     General: Skin is warm and dry  I have personally reviewed pertinent films in PACS and my interpretation is as follows  MRI left shoulder demonstrates high grade supraspinatus tear with progression as compared to prior MRI       Scribe Attestation    I,:  Neto Maher am acting as a scribe while in the presence of the attending physician :       I,:  Dawson Millan MD personally performed the services described in this documentation    as scribed in my presence :

## 2023-07-11 ENCOUNTER — OFFICE VISIT (OUTPATIENT)
Dept: OBGYN CLINIC | Facility: OTHER | Age: 43
End: 2023-07-11
Payer: COMMERCIAL

## 2023-07-11 VITALS
HEART RATE: 58 BPM | BODY MASS INDEX: 26.66 KG/M2 | SYSTOLIC BLOOD PRESSURE: 130 MMHG | WEIGHT: 180 LBS | HEIGHT: 69 IN | DIASTOLIC BLOOD PRESSURE: 77 MMHG

## 2023-07-11 DIAGNOSIS — M75.102 TEAR OF LEFT SUPRASPINATUS TENDON: Primary | ICD-10-CM

## 2023-07-11 PROCEDURE — 99214 OFFICE O/P EST MOD 30 MIN: CPT | Performed by: ORTHOPAEDIC SURGERY

## 2023-07-11 NOTE — PATIENT INSTRUCTIONS
You are being scheduled for a shoulder arthroscopy to treat your symptoms. Below are some instructions and information on what to expect before and after your surgery. Pre-Surgical Preparation for Arthroscopic Shoulder Surgery: You will be contacted the evening prior to your surgery to confirm the scheduled time of the procedure and when to arrive at the hospital.   Do not eat or drink anything after midnight the night before your surgery. Since you are having out-patient surgery, make sure that you have someone who can drive you home later in the day. Also, prepare that person for a long day, as the process of safely preparing for and recovering from the procedure is more time consuming than the actual procedure! As you will be in a sling after surgery, please wear or bring a loose fitting button-down shirt so that you can easily place this over the sling when you leave the surgical suite. This avoids having to place the operative arm in a sleeve. Most patients find that this is the easiest outfit to wear for the first week or so after surgery so you may want to plan accordingly. Most patients find that lying down in bed after shoulder surgery accentuates their discomfort. This is likely related to the effect of gravity on the swelling in the shoulder. As a result, most patients sleep better in a recliner or in bed with pillows propped up behind their back for the first few days or weeks after surgery. It is a good idea to plan for this ahead of time so there will be less hassle getting things set up the night after surgery. What to Expect After Arthroscopic Shoulder Surgery: It is normal to have swelling and discomfort in the shoulder for several days or a week after surgery. It is also normal to have a small amount of drainage from the surgical wounds (especially the first few days after surgery), as we put fluid into the shoulder to visualize the structures during surgery. It is NOT normal to have foul smelling, purulent drainage and if this is noted, please contact the office immediately or proceed to the emergency room for evaluation as this may indicate an infection. Applying ice bags to the shoulder may help with pain that is not controlled by the regional block. Ice should be applied 20-30 minutes at a time, every hour or two. Make sure to put a thin towel or T-shirt next to your skin to avoid direct contact of the ice with the skin. Icing is most helpful in the first 48 hours, although many people find that continuing past this time frame lessens their postoperative pain. Please note that your post-operative dressing is not conductive to ice, so if you need to, it is okay to remove that dressing even the night after surgery and place band-aids over the wounds in order for the ice to take effect. Pain Control    Most patients will receive a nerve block, the local anesthetic may keep your whole arm numb for up to 4 days. You will be given a prescription for narcotic pain medication when you are discharged from the hospital.  With the newer nerve block that is being utilized, patients are rarely requiring the use of this narcotic pain medication. If you find you do not tolerate that type of pain medicine well, call our office and we will try another one. In addition to the narcotic pain medication, it is safe to use an anti-inflammatory (unless the patient has a medical condition that would not allow safe use of this mediation). This includes the Advil, Motrin, Ibuprofen and Alleve category of medications. Simply follow the over the counter dosing on the package and use as indicated as another adjunct. Importantly since these medications are all very similar, use only one of them. Tylenol is a separate medication that can be utilized as well and can be taken at the same time as the other medication or given in a "staggered" manner.   Just make sure that you follow the dosing on the over the counter bottle instructions. Also make sure that the pain medication prescribed by Dr Hermelinda Fierro team does not contain acetaminophen (this is found in Percocet and Vicodin). Typically we do not prescribe those types of pain medications but if for some reason that has been prescribed DO NOT add more Tylenol (acetaminophen) as you could end up taking too much of that medication. As mentioned above, most patients find that lying down accentuates their discomfort. You might sleep better in a recliner, or propped up in bed. Dr. Zeynep Mckenna encourages patients to safely ambulate around the house as much as possible in the first few days after the procedure as this can help with blood circulation in the legs. While the incidence of blood clots is very rare following shoulder surgery, early ambulation is a great way to help decrease the already low rate. 24 hours after the surgery you may remove the bandage and cover incisions with Band-Aids if needed. At that time you may shower, the wounds will have a surgical glue that will protect them from shower water but do not submerge your incisions directly (bathing or swimming) until at least 2 weeks post-operatively. It is safe to let the arm hang at the side and take a shower and put on a shirt without the sling on. Just make sure that you do not use the operative are to reach out and grab anything as that may damage the repair. When you are done showering and getting dressed please return the operative arm to the sling. Unless noted otherwise in your discharge paperwork, Dr. Zeynep Mckenna uses absorbable sutures so they do not need to be removed. Dr. Mick Méndez physician assistant (PA) will see you in the office a few days after the procedure to review the intra-operative findings and to initiate physical therapy if appropriate.   A post-operative appointment should have been scheduled for you already, but if for some reason this did not happen, please call the office to make one. Physical therapy is important after nearly all shoulder surgeries and a detailed rehabilitation plan based on the specific intra-operative findings and procedures will be provided to your therapist at the first post-operative office visit. Most patients have post-operative therapy appointments scheduled pre-operatively, but if you do not, that will be handled at the first post-operative office visit. Unless expressly directed otherwise it is safe to remove the sling even the first day after the surgery and let the arm hang by the side. This allows patients to shower and even put a shirt on (bad arm in the sleeve first). It is also safe to flex and extend their wrist, hand and fingers as much as possible when the block wears off. These simple motions can serve to pump fluid out of the forearm and decrease swelling in the arm.

## 2023-07-11 NOTE — PROGRESS NOTES
Assessment  Diagnoses and all orders for this visit:    Tear of left supraspinatus tendon      Discussion and Plan:  While the patient has made some progress since his last visit he still is symptomatic and not functioning to the level that he wishes with regards to his left shoulder rotator cuff tear which is shown progression over time. He is a good candidate to consider arthroscopic repair at this time and wishes to proceed forward scheduling. A thorough discussion was performed with the patient regarding the risks and benefit of operative and nonoperative treatment of their rotator cuff tear. Risks discussed include but were not limited to infection, neurovascular injury, recurrent tear, nonhealing of the repair, need for further surgery, need for biceps tenodesis or tenotomy, stiffness, need for prolonged rehabilitation, as well as the risk of anesthesia. After this discussion all questions were answered and informed consent was obtained in the office for arthroscopic rotator cuff repair of the left shoulder. The patient will be scheduled for this procedure accordingly. Subjective:   Patient ID: Karsten Philip is a 38 y.o. male      HPI  Patient presents today for follow up regarding his left shoulder. Patient has a known left supraspinatus tear which has progressed on his MRI from May 2023. At that time patient wanted to continue conservative treatment.       The following portions of the patient's history were reviewed and updated as appropriate: allergies, current medications, past family history, past medical history, past social history, past surgical history and problem list.      Objective:  /77 (BP Location: Right arm, Patient Position: Sitting, Cuff Size: Adult)   Pulse 58   Ht 5' 9" (1.753 m)   Wt 81.6 kg (180 lb)   BMI 26.58 kg/m²         Left Shoulder Exam      Tenderness   Left shoulder tenderness location: Trapezius.     Range of Motion   Active abduction: 90   External rotation: 70   Forward flexion: 170      Muscle Strength   Abduction: 3/5   Internal rotation: 4/5   External rotation: 4/5      Tests   Rivas test: positive  Impingement: positive  Drop arm: positive     Other   Erythema: absent  Scars: absent  Sensation: normal  Pulse: present       Physical Exam  Vitals and nursing note reviewed. Constitutional:       Appearance: He is well-developed. HENT:      Head: Normocephalic and atraumatic. Eyes:      Pupils: Pupils are equal, round, and reactive to light. Cardiovascular:      Rate and Rhythm: Normal rate and regular rhythm. Pulses: Normal pulses. Heart sounds: Normal heart sounds. Pulmonary:      Effort: Pulmonary effort is normal. No respiratory distress. Breath sounds: Normal breath sounds. Abdominal:      General: Abdomen is flat. There is no distension. Palpations: Abdomen is soft. Musculoskeletal:      Cervical back: Normal range of motion and neck supple. Skin:     General: Skin is warm and dry. Neurological:      Mental Status: He is alert and oriented to person, place, and time. Psychiatric:         Behavior: Behavior normal.       I have personally reviewed pertinent films in PACS and my interpretation is as follows. MRI left shoulder (5/08/23) demonstrates high grade supraspinatus tear with progression as compared to prior MRI.      Scribe Attestation    I,:  Aguilar Weinstein am acting as a scribe while in the presence of the attending physician.:       I,:  Abdelrahman Che MD personally performed the services described in this documentation    as scribed in my presence.:

## 2023-07-21 ENCOUNTER — OFFICE VISIT (OUTPATIENT)
Dept: FAMILY MEDICINE CLINIC | Facility: CLINIC | Age: 43
End: 2023-07-21
Payer: COMMERCIAL

## 2023-07-21 VITALS
SYSTOLIC BLOOD PRESSURE: 120 MMHG | RESPIRATION RATE: 16 BRPM | HEIGHT: 69 IN | DIASTOLIC BLOOD PRESSURE: 70 MMHG | OXYGEN SATURATION: 98 % | HEART RATE: 61 BPM | BODY MASS INDEX: 27.25 KG/M2 | WEIGHT: 184 LBS

## 2023-07-21 DIAGNOSIS — Z13.1 DIABETES MELLITUS SCREENING: ICD-10-CM

## 2023-07-21 DIAGNOSIS — Z12.11 COLON CANCER SCREENING: ICD-10-CM

## 2023-07-21 DIAGNOSIS — Z00.00 PHYSICAL EXAM, ANNUAL: Primary | ICD-10-CM

## 2023-07-21 DIAGNOSIS — K21.9 GASTROESOPHAGEAL REFLUX DISEASE WITHOUT ESOPHAGITIS: ICD-10-CM

## 2023-07-21 DIAGNOSIS — Z13.220 SCREENING CHOLESTEROL LEVEL: ICD-10-CM

## 2023-07-21 PROCEDURE — 99396 PREV VISIT EST AGE 40-64: CPT | Performed by: FAMILY MEDICINE

## 2023-07-21 RX ORDER — FAMOTIDINE 20 MG/1
20 TABLET, FILM COATED ORAL DAILY
COMMUNITY

## 2023-07-21 NOTE — PROGRESS NOTES
Subjective:      Patient ID: Yeison Walls is a 37 y.o. male. 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 No particular complaints or concerns at this time. Patient does have a high-grade supraspinatus tear but it really has not been slowing him down and with his schedule he is not in a hurry to fix it. Patient remains physically active. Does not seem to slow him down but he is cognizant of which motions not to perform. Patient would like to have cardio IQ cholesterol screening performed. Patient also turned 37years old last week so he is now due for colon cancer screening. Patient also has longstanding history of episodic GERD for which she does take as needed Pepcid. Has food triggers that he is aware of      Past Medical History:   Diagnosis Date   • Hyperlipidemia        No family history on file. No past surgical history on file. reports that he has never smoked. He has never used smokeless tobacco. He reports that he does not drink alcohol and does not use drugs. Current Outpatient Medications:   •  famotidine (PEPCID) 20 mg tablet, Take 20 mg by mouth daily prn, Disp: , Rfl:     The following portions of the patient's history were reviewed and updated as appropriate: allergies, current medications, past family history, past medical history, past social history, past surgical history and problem list.    Review of Systems   Constitutional: Negative. HENT: Negative. Eyes: Negative. Respiratory: Negative. Cardiovascular: Negative. Gastrointestinal: Negative. Infrequent GERD   Endocrine: Negative. Genitourinary: Negative. Musculoskeletal: Negative. Negative for arthralgias. Skin: Negative. Allergic/Immunologic: Negative. Neurological: Negative. Hematological: Negative. Psychiatric/Behavioral: Negative. All other systems reviewed and are negative.           Objective:    /70 Pulse 61   Resp 16   Ht 5' 9" (1.753 m)   Wt 83.5 kg (184 lb)   SpO2 98%   BMI 27.17 kg/m²      Physical Exam  Vitals and nursing note reviewed. Constitutional:       General: He is not in acute distress. Appearance: Normal appearance. He is well-developed and normal weight. He is not ill-appearing. HENT:      Head: Normocephalic and atraumatic. Right Ear: Tympanic membrane, ear canal and external ear normal.      Left Ear: Tympanic membrane, ear canal and external ear normal.      Nose: Nose normal.      Mouth/Throat:      Mouth: Mucous membranes are moist.      Pharynx: Oropharynx is clear. Eyes:      Extraocular Movements: Extraocular movements intact. Conjunctiva/sclera: Conjunctivae normal.      Pupils: Pupils are equal, round, and reactive to light. Cardiovascular:      Rate and Rhythm: Normal rate and regular rhythm. Pulses: Normal pulses. Heart sounds: Normal heart sounds. No murmur heard. Pulmonary:      Effort: Pulmonary effort is normal.      Breath sounds: Normal breath sounds. Abdominal:      General: Abdomen is flat. Bowel sounds are normal.      Palpations: Abdomen is soft. Musculoskeletal:         General: Normal range of motion. Cervical back: Normal range of motion and neck supple. Skin:     General: Skin is warm and dry. Neurological:      General: No focal deficit present. Mental Status: He is alert and oriented to person, place, and time. Psychiatric:         Mood and Affect: Mood normal.         Behavior: Behavior normal.         Thought Content: Thought content normal.         Judgment: Judgment normal.           No results found for this or any previous visit (from the past 1008 hour(s)). Assessment/Plan:    Gastroesophageal reflux disease without esophagitis  Episodic GERD for which she does take Pepcid on an as-needed basis.   He will be going for colonoscopy and should have upper endoscopy due to longstanding history of episodic GERD    Physical exam, annual  - Annual physical examination performed    -Order provided for screening blood work including CBC, CMP, screening hemoglobin A1c, TSH and CardioIQ lipid profile    -Patient does remain physically active    Colon cancer screening  Referral to gastroenterology for screening colonoscopy          Problem List Items Addressed This Visit        Digestive    Gastroesophageal reflux disease without esophagitis     Episodic GERD for which she does take Pepcid on an as-needed basis.   He will be going for colonoscopy and should have upper endoscopy due to longstanding history of episodic GERD         Relevant Medications    famotidine (PEPCID) 20 mg tablet    Other Relevant Orders    Ambulatory Referral to Gastroenterology       Other    Colon cancer screening     Referral to gastroenterology for screening colonoscopy         Relevant Orders    Ambulatory Referral to Gastroenterology    Physical exam, annual - Primary     - Annual physical examination performed    -Order provided for screening blood work including CBC, CMP, screening hemoglobin A1c, TSH and CardioIQ lipid profile    -Patient does remain physically active         Relevant Orders    CBC and differential    Comprehensive metabolic panel    TSH, 3rd generation with Free T4 reflex   Other Visit Diagnoses     Screening cholesterol level        Relevant Orders    Cardio IQ(R) Advanced Lipid Panel    Diabetes mellitus screening        Relevant Orders    Hemoglobin A1C

## 2023-07-21 NOTE — ASSESSMENT & PLAN NOTE
Episodic GERD for which she does take Pepcid on an as-needed basis.   He will be going for colonoscopy and should have upper endoscopy due to longstanding history of episodic GERD

## 2023-07-21 NOTE — ASSESSMENT & PLAN NOTE
- Annual physical examination performed    -Order provided for screening blood work including CBC, CMP, screening hemoglobin A1c, TSH and CardioIQ lipid profile    -Patient does remain physically active

## 2023-07-24 ENCOUNTER — TELEPHONE (OUTPATIENT)
Dept: GASTROENTEROLOGY | Facility: AMBULARY SURGERY CENTER | Age: 43
End: 2023-07-24

## 2023-07-24 NOTE — TELEPHONE ENCOUNTER
----- Message from Elvin Duran sent at 7/24/2023  7:56 AM EDT -----  Regarding: FW: New patient for you    ----- Message -----  From: Steffanie Sams MD  Sent: 7/21/2023   6:34 PM EDT  To: Alexandro Abad DO; #  Subject: RE: New patient for you                          Office staff, please put DR. Michele Meza on my schdule at his convienience. OK to overbook    Dr. Petrona Christianson!  ----- Message -----  From: Alexandro Abad DO  Sent: 7/21/2023  10:35 AM EDT  To: Steffanie Sams MD  Subject: New patient for you                              Dr. Rosette Opitz,    This wonderful patient will be new to you formally as a patient. He just turned 43 last week so he is in need of colonoscopy. Episodic GERD over the last 20 years for which he does take as needed Pepcid so he probably would need an upper as well.   Please be certain to give Dr. Michele Meza a hard time about getting older ;)    Olegario Mazariegos

## 2023-09-11 ENCOUNTER — TELEPHONE (OUTPATIENT)
Age: 43
End: 2023-09-11

## 2023-09-11 NOTE — TELEPHONE ENCOUNTER
Caller: Patient    Doctor: Juana Amaya    Reason for call: Patient stated he would need to cancel surgery.  Would like to preschedule possibly in the spring    Call back#: [phone]

## 2023-09-19 PROBLEM — Z12.11 COLON CANCER SCREENING: Status: RESOLVED | Noted: 2023-07-21 | Resolved: 2023-09-19

## 2023-09-29 ENCOUNTER — APPOINTMENT (OUTPATIENT)
Dept: LAB | Facility: CLINIC | Age: 43
End: 2023-09-29
Payer: COMMERCIAL

## 2023-09-29 ENCOUNTER — TELEPHONE (OUTPATIENT)
Dept: GASTROENTEROLOGY | Facility: CLINIC | Age: 43
End: 2023-09-29

## 2023-09-29 DIAGNOSIS — Z13.220 SCREENING CHOLESTEROL LEVEL: ICD-10-CM

## 2023-09-29 DIAGNOSIS — Z13.1 DIABETES MELLITUS SCREENING: ICD-10-CM

## 2023-09-29 DIAGNOSIS — Z00.00 PHYSICAL EXAM, ANNUAL: ICD-10-CM

## 2023-09-29 LAB
ALBUMIN SERPL BCP-MCNC: 4.4 G/DL (ref 3.5–5)
ALP SERPL-CCNC: 62 U/L (ref 34–104)
ALT SERPL W P-5'-P-CCNC: 31 U/L (ref 7–52)
ANION GAP SERPL CALCULATED.3IONS-SCNC: 9 MMOL/L
AST SERPL W P-5'-P-CCNC: 20 U/L (ref 13–39)
BASOPHILS # BLD AUTO: 0.03 THOUSANDS/ÂΜL (ref 0–0.1)
BASOPHILS NFR BLD AUTO: 1 % (ref 0–1)
BILIRUB SERPL-MCNC: 1.42 MG/DL (ref 0.2–1)
BUN SERPL-MCNC: 15 MG/DL (ref 5–25)
CALCIUM SERPL-MCNC: 9.3 MG/DL (ref 8.4–10.2)
CHLORIDE SERPL-SCNC: 104 MMOL/L (ref 96–108)
CO2 SERPL-SCNC: 25 MMOL/L (ref 21–32)
CREAT SERPL-MCNC: 0.87 MG/DL (ref 0.6–1.3)
EOSINOPHIL # BLD AUTO: 0.28 THOUSAND/ÂΜL (ref 0–0.61)
EOSINOPHIL NFR BLD AUTO: 7 % (ref 0–6)
ERYTHROCYTE [DISTWIDTH] IN BLOOD BY AUTOMATED COUNT: 12.1 % (ref 11.6–15.1)
EST. AVERAGE GLUCOSE BLD GHB EST-MCNC: 111 MG/DL
GFR SERPL CREATININE-BSD FRML MDRD: 104 ML/MIN/1.73SQ M
GLUCOSE P FAST SERPL-MCNC: 98 MG/DL (ref 65–99)
HBA1C MFR BLD: 5.5 %
HCT VFR BLD AUTO: 45.1 % (ref 36.5–49.3)
HGB BLD-MCNC: 15.1 G/DL (ref 12–17)
IMM GRANULOCYTES # BLD AUTO: 0.01 THOUSAND/UL (ref 0–0.2)
IMM GRANULOCYTES NFR BLD AUTO: 0 % (ref 0–2)
LYMPHOCYTES # BLD AUTO: 1.43 THOUSANDS/ÂΜL (ref 0.6–4.47)
LYMPHOCYTES NFR BLD AUTO: 34 % (ref 14–44)
MCH RBC QN AUTO: 30.6 PG (ref 26.8–34.3)
MCHC RBC AUTO-ENTMCNC: 33.5 G/DL (ref 31.4–37.4)
MCV RBC AUTO: 92 FL (ref 82–98)
MONOCYTES # BLD AUTO: 0.3 THOUSAND/ÂΜL (ref 0.17–1.22)
MONOCYTES NFR BLD AUTO: 7 % (ref 4–12)
NEUTROPHILS # BLD AUTO: 2.11 THOUSANDS/ÂΜL (ref 1.85–7.62)
NEUTS SEG NFR BLD AUTO: 51 % (ref 43–75)
NRBC BLD AUTO-RTO: 0 /100 WBCS
PLATELET # BLD AUTO: 253 THOUSANDS/UL (ref 149–390)
PMV BLD AUTO: 10.8 FL (ref 8.9–12.7)
POTASSIUM SERPL-SCNC: 4.2 MMOL/L (ref 3.5–5.3)
PROT SERPL-MCNC: 7.1 G/DL (ref 6.4–8.4)
RBC # BLD AUTO: 4.93 MILLION/UL (ref 3.88–5.62)
SODIUM SERPL-SCNC: 138 MMOL/L (ref 135–147)
TSH SERPL DL<=0.05 MIU/L-ACNC: 0.91 UIU/ML (ref 0.45–4.5)
WBC # BLD AUTO: 4.16 THOUSAND/UL (ref 4.31–10.16)

## 2023-09-29 PROCEDURE — 85025 COMPLETE CBC W/AUTO DIFF WBC: CPT

## 2023-09-29 PROCEDURE — 36415 COLL VENOUS BLD VENIPUNCTURE: CPT

## 2023-09-29 PROCEDURE — 80053 COMPREHEN METABOLIC PANEL: CPT

## 2023-09-29 PROCEDURE — 83036 HEMOGLOBIN GLYCOSYLATED A1C: CPT

## 2023-09-29 PROCEDURE — 84443 ASSAY THYROID STIM HORMONE: CPT

## 2023-09-29 NOTE — TELEPHONE ENCOUNTER
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 Orders are in. Please give him instructions on dulcolax/miralax prep, he prefers [facility] for scope  No need for OV. I discussed with him    Thank you!

## 2023-09-29 NOTE — TELEPHONE ENCOUNTER
Scheduled date of EGD/colonoscopy (as of today): 1/5/24  Physician performing EGD/colonoscopy: Dr. Amie Jones  Location of EGD/colonoscopy: [facility]  Desired bowel prep reviewed with patient: miralax w/ ivone via my chart   Instructions reviewed with patient by: myron  Clearances:  n/a

## 2023-10-04 ENCOUNTER — DOCUMENTATION (OUTPATIENT)
Dept: GASTROENTEROLOGY | Facility: AMBULARY SURGERY CENTER | Age: 43
End: 2023-10-04

## 2023-10-04 DIAGNOSIS — K21.9 GASTROESOPHAGEAL REFLUX DISEASE WITHOUT ESOPHAGITIS: Primary | ICD-10-CM

## 2023-10-04 RX ORDER — SUCRALFATE ORAL 1 G/10ML
1 SUSPENSION ORAL 2 TIMES DAILY
Qty: 140 ML | Refills: 0 | Status: SHIPPED | OUTPATIENT
Start: 2023-10-04 | End: 2023-10-11

## 2023-10-18 LAB — MISCELLANEOUS LAB TEST RESULT: NORMAL

## 2023-10-19 DIAGNOSIS — E78.5 DYSLIPIDEMIA: Primary | ICD-10-CM

## 2023-10-19 RX ORDER — ROSUVASTATIN CALCIUM 10 MG/1
10 TABLET, COATED ORAL DAILY
Qty: 90 TABLET | Refills: 3 | Status: SHIPPED | OUTPATIENT
Start: 2023-10-19

## 2023-12-12 ENCOUNTER — PREP FOR PROCEDURE (OUTPATIENT)
Dept: OBGYN CLINIC | Facility: OTHER | Age: 43
End: 2023-12-12

## 2023-12-12 ENCOUNTER — TELEPHONE (OUTPATIENT)
Dept: OBGYN CLINIC | Facility: MEDICAL CENTER | Age: 43
End: 2023-12-12

## 2023-12-12 ENCOUNTER — OFFICE VISIT (OUTPATIENT)
Dept: UROLOGY | Facility: CLINIC | Age: 43
End: 2023-12-12
Payer: COMMERCIAL

## 2023-12-12 VITALS
OXYGEN SATURATION: 98 % | BODY MASS INDEX: 25.92 KG/M2 | HEART RATE: 68 BPM | HEIGHT: 69 IN | SYSTOLIC BLOOD PRESSURE: 110 MMHG | DIASTOLIC BLOOD PRESSURE: 70 MMHG | WEIGHT: 175 LBS

## 2023-12-12 DIAGNOSIS — R39.9 LOWER URINARY TRACT SYMPTOMS (LUTS): ICD-10-CM

## 2023-12-12 DIAGNOSIS — M75.102 TEAR OF LEFT SUPRASPINATUS TENDON: Primary | ICD-10-CM

## 2023-12-12 DIAGNOSIS — Z30.09 VASECTOMY EVALUATION: Primary | ICD-10-CM

## 2023-12-12 PROCEDURE — 99203 OFFICE O/P NEW LOW 30 MIN: CPT | Performed by: PHYSICIAN ASSISTANT

## 2023-12-12 RX ORDER — DIAZEPAM 5 MG/1
TABLET ORAL
Qty: 1 TABLET | Refills: 0 | Status: SHIPPED | OUTPATIENT
Start: 2023-12-12

## 2023-12-12 NOTE — TELEPHONE ENCOUNTER
He only needs to come in if he has questions to discuss with Priscilla, otherwise he can be scheduled at his convenience. Orders in computer.   Will need to set up his therapy

## 2023-12-12 NOTE — PROGRESS NOTES
1. Vasectomy evaluation  Vasectomy    diazepam (VALIUM) 5 mg tablet      2. Lower urinary tract symptoms (LUTS)  Urinalysis with microscopic          Assessment and plan:       We had a long discussion regarding the options for birth control. I told the patient that vasectomy is considered to be a permanent surgical sterilization procedure. We spoke about other options including the possibility of vasectomy reversal at a later time. He understands that vasectomy confers no immunity to STDs. I also told him that according to our present knowledge, there is no causal relationship between vasectomy and subsequent development of prostate cancer or testicular cancer. No change in libido erection or ejaculation. We spoke about the potential complications. The most common one in the short term is scrotal hematoma and infection, which rarely requires re-operation. Additionally, he can react to the anesthetic, develop scrotal swelling, have pain or skin bruising. We spoke about post procedure care to try to minimize this complication. I also asked him to refrain from aspirin or fish oil products and alcohol prior to the procedure. The long-term complications include but are not limited to vasectomy failure by recanalization, chronic epididymal discomfort, pain, among other possibilities. I described to him how this procedure is normally performed in an office setting and he understands that if anesthesia is desired, this can be performed for him in an outpatient operative setting. Finally, he understands that following vasectomy, he’ll need to use other means of birth control until he’s had semen analyses that demonstrate the absence of sperm. He understands it will be his responsibility to submit these semen specimens and call our office for the results.  I told him again that recanalization is a small but real possibility, and if he is ever concerned about it he can submit another semen specimen for analysis. After discussing the risks, benefits, possible complications and alternatives, informed consents were obtained. Diazepam was prescribed, and review dosing, adverse effects and timing of the procedure. He will return in the near future for the procedure. Chief Complaint     Desire for vasectomy      History of Present Illness     Jan Hodgson is a 37 y.o. male referred for evaluation of vasectomy. He has 2 biological children. He states that he and his partner have come to the mutual decision they do not desire any additional children. He has no prior past medical, past surgical, or past urologic history      Review of Systems     Review of Systems   Constitutional: Negative for activity change and fatigue. HENT: Negative for congestion. Eyes: Negative for visual disturbance. Respiratory: Negative for shortness of breath and wheezing. Cardiovascular: Negative for chest pain and leg swelling. Gastrointestinal: Negative for abdominal pain. Endocrine: Negative for polyuria. Genitourinary: Negative for dysuria, flank pain, hematuria and urgency. Musculoskeletal: Negative for back pain. Allergic/Immunologic: Negative for immunocompromised state. Neurological: Negative for dizziness and numbness. Psychiatric/Behavioral: Negative for dysphoric mood. All other systems reviewed and are negative. Allergies     No Known Allergies    Physical Exam     Physical Exam   Constitutional: He is oriented to person, place, and time. He appears well-developed and well-nourished. No distress. HENT:   Head: Normocephalic and atraumatic. Eyes: EOM are normal.   Neck: Normal range of motion. Cardiovascular:   Negative lower extremity edema   Pulmonary/Chest: Effort normal and breath sounds normal.   Abdominal: Soft. Genitourinary:   Genitourinary Comments: Vas deferens are palpable bilaterally. Musculoskeletal: Normal range of motion.    Neurological: He is alert and oriented to person, place, and time. Skin: Skin is warm. Psychiatric: He has a normal mood and affect. His behavior is normal.         Vital Signs  Vitals:    12/12/23 1315   BP: 110/70   BP Location: Left arm   Patient Position: Sitting   Cuff Size: Standard   Pulse: 68   SpO2: 98%   Weight: 79.4 kg (175 lb)   Height: 5' 9" (1.753 m)         Current Medications       Current Outpatient Medications:     diazepam (VALIUM) 5 mg tablet, Take 1 tablet PO 1 hour prior to vasectomy, Disp: 1 tablet, Rfl: 0    famotidine (PEPCID) 20 mg tablet, Take 20 mg by mouth daily prn, Disp: , Rfl:     rosuvastatin (CRESTOR) 10 MG tablet, Take 1 tablet (10 mg total) by mouth daily, Disp: 90 tablet, Rfl: 3    sucralfate (CARAFATE) 1 g/10 mL suspension, Take 10 mL (1 g total) by mouth 2 (two) times a day for 7 days, Disp: 140 mL, Rfl: 0      Active Problems     Patient Active Problem List   Diagnosis    Internal derangement of right shoulder    Internal derangement of shoulder, left    Cervical pain (neck)    Tear of left supraspinatus tendon    Peroneal tendonitis, left    Plantar fasciitis, left    Exposure to blood or body fluid    Occupational exposure to COVID-19 virus    Physical exam, annual    Encounter for screening for HIV    Seasonal allergies    Moderate left ankle sprain    Gastroesophageal reflux disease without esophagitis    Dyslipidemia         Past Medical History     Past Medical History:   Diagnosis Date    Hyperlipidemia          Surgical History     History reviewed. No pertinent surgical history. Family History     History reviewed. No pertinent family history. Social History     Social History     Social History     Tobacco Use   Smoking Status Never   Smokeless Tobacco Never       Portions of the record may have been created with voice recognition software. Occasional wrong word or "sound a like" substitutions may have occurred due to the inherent limitations of voice recognition software.   Read the chart carefully and recognize, using context, where substitutions have occurred.

## 2023-12-12 NOTE — TELEPHONE ENCOUNTER
Caller: Mr Mary Jo Li    Doctor: Dr Mariely Torres     Reason for call: The patient was scheduled for surgery :  REPAIR ROTATOR CUFF  ARTHROSCOPIC (Left: Shoulder ) on 9/27/23 but had to cancel. He would like to please schedule for the end of March 2024. Please call to discuss.  Thank you     Call back#: [phone]

## 2023-12-13 NOTE — TELEPHONE ENCOUNTER
I spoke with Dr. Jose Rollins and scheduled his sx for 3/27/24, he is aware to start PT the following Monday after surgery and po scheduled.  He wanted to set up an appt with Dr. Ryanne Coats to discuss sx which was scheduled for 1/11/24 @ 8am

## 2024-01-10 ENCOUNTER — ANESTHESIA EVENT (OUTPATIENT)
Dept: ANESTHESIOLOGY | Facility: HOSPITAL | Age: 44
End: 2024-01-10

## 2024-01-10 ENCOUNTER — ANESTHESIA (OUTPATIENT)
Dept: ANESTHESIOLOGY | Facility: HOSPITAL | Age: 44
End: 2024-01-10

## 2024-01-11 ENCOUNTER — OFFICE VISIT (OUTPATIENT)
Dept: OBGYN CLINIC | Facility: OTHER | Age: 44
End: 2024-01-11
Payer: COMMERCIAL

## 2024-01-11 VITALS
BODY MASS INDEX: 25.92 KG/M2 | HEIGHT: 69 IN | SYSTOLIC BLOOD PRESSURE: 126 MMHG | WEIGHT: 175 LBS | HEART RATE: 62 BPM | DIASTOLIC BLOOD PRESSURE: 81 MMHG

## 2024-01-11 DIAGNOSIS — M75.102 TEAR OF LEFT SUPRASPINATUS TENDON: Primary | ICD-10-CM

## 2024-01-11 PROCEDURE — 99213 OFFICE O/P EST LOW 20 MIN: CPT | Performed by: ORTHOPAEDIC SURGERY

## 2024-01-11 NOTE — PROGRESS NOTES
Assessment  Diagnoses and all orders for this visit:    Tear of left supraspinatus tendon        Discussion and Plan:    The patient continues to be symptomatic for his left shoulder supraspinatus tendon tear and is failed to improve with appropriate nonoperative care.  He does not have pain at this time but does demonstrate significant weakness and does feel the weakness is impeding his ability to do activities he wishes to do therefore he is indicated at this time to consider arthroscopic rotator cuff pair of his left shoulder.  We have discussed this at length in the past and he is scheduled to have this performed at the end of March, he may have to adjust that based on a vacation coming up at that time and I recommended he likely have the procedure performed following the vacation so that he can enjoy his time on vacation without having to wear a sling.  He will consider timing and be scheduled accordingly.  Just to be sure we did again review risks and benefits as below and he is scheduled for arthroscopic rotator cuff pair of left shoulder at his convenience.      A thorough discussion was performed with the patient regarding the risks and benefit of operative and nonoperative treatment of their rotator cuff tear. Risks discussed include but were not limited to infection, neurovascular injury, recurrent tear, nonhealing of the repair, need for further surgery, need for biceps tenodesis or tenotomy, stiffness, need for prolonged rehabilitation, as well as the risk of anesthesia. After this discussion all questions were answered and informed consent was obtained in the office for arthroscopic right shoulder rotator cuff repair.    Subjective:   Patient ID: Ash Da Silva is a 43 y.o. male      HPI    Patient returns for follow-up of his left shoulder supraspinatus tendon tear.  He continues to be limited with strength and this is affecting his activities of daily living as well as activities he wishes to perform.  " He did have a new injury while playing hockey which aggravated his shoulder but he does state he is pain-free today.  He is scheduled to have the shoulder fixed in late March.    The following portions of the patient's history were reviewed and updated as appropriate: allergies, current medications, past family history, past medical history, past social history, past surgical history and problem list.        Objective:  /81 (BP Location: Left arm, Patient Position: Sitting, Cuff Size: Standard)   Pulse 62   Ht 5' 9\" (1.753 m)   Wt 79.4 kg (175 lb)   BMI 25.84 kg/m²       Left Shoulder Exam     Tenderness   The patient is experiencing no tenderness.     Range of Motion   Active abduction:  90   External rotation:  70   Forward flexion:  170     Muscle Strength   Abduction: 4/5   Internal rotation: 4/5   External rotation: 4/5   Supraspinatus: 4/5     Tests   Rivas test: negative  Impingement: positive  Drop arm: negative    Other   Erythema: absent  Scars: absent  Sensation: normal  Pulse: present             Physical Exam  Vitals and nursing note reviewed.   Constitutional:       Appearance: He is well-developed.   HENT:      Head: Normocephalic and atraumatic.   Cardiovascular:      Rate and Rhythm: Normal rate and regular rhythm.      Pulses: Normal pulses.      Heart sounds: Normal heart sounds.   Pulmonary:      Effort: Pulmonary effort is normal. No respiratory distress.   Abdominal:      General: There is no distension.      Palpations: Abdomen is soft.   Musculoskeletal:      Cervical back: Normal range of motion and neck supple.   Skin:     General: Skin is warm and dry.   Neurological:      Mental Status: He is alert and oriented to person, place, and time.   Psychiatric:         Mood and Affect: Mood normal.         Behavior: Behavior normal.           I have personally reviewed pertinent films in PACS and my interpretation is as follows.    MRI scan left shoulder shows a near full-thickness " anterior supraspinatus tendon tear with no retraction and no atrophy

## 2024-01-19 ENCOUNTER — TELEPHONE (OUTPATIENT)
Dept: GASTROENTEROLOGY | Facility: CLINIC | Age: 44
End: 2024-01-19

## 2024-01-19 NOTE — TELEPHONE ENCOUNTER
Confirmed procedure with pt. He has his  arranged and will be called day prior with arrival time. He will get his prep instructions from my chart.

## 2024-01-24 ENCOUNTER — ANESTHESIA EVENT (OUTPATIENT)
Dept: GASTROENTEROLOGY | Facility: AMBULARY SURGERY CENTER | Age: 44
End: 2024-01-24

## 2024-01-24 ENCOUNTER — ANESTHESIA (OUTPATIENT)
Dept: GASTROENTEROLOGY | Facility: AMBULARY SURGERY CENTER | Age: 44
End: 2024-01-24

## 2024-01-24 ENCOUNTER — HOSPITAL ENCOUNTER (OUTPATIENT)
Dept: GASTROENTEROLOGY | Facility: AMBULARY SURGERY CENTER | Age: 44
Setting detail: OUTPATIENT SURGERY
Discharge: HOME | End: 2024-01-24
Attending: INTERNAL MEDICINE
Payer: COMMERCIAL

## 2024-01-24 VITALS
DIASTOLIC BLOOD PRESSURE: 80 MMHG | HEIGHT: 68 IN | BODY MASS INDEX: 25.76 KG/M2 | TEMPERATURE: 49.3 F | HEART RATE: 98 BPM | SYSTOLIC BLOOD PRESSURE: 121 MMHG | WEIGHT: 170 LBS | OXYGEN SATURATION: 98 % | RESPIRATION RATE: 20 BRPM

## 2024-01-24 DIAGNOSIS — K21.9 GASTROESOPHAGEAL REFLUX DISEASE WITHOUT ESOPHAGITIS: ICD-10-CM

## 2024-01-24 DIAGNOSIS — Z12.11 COLON CANCER SCREENING: ICD-10-CM

## 2024-01-24 PROCEDURE — 43239 EGD BIOPSY SINGLE/MULTIPLE: CPT | Performed by: INTERNAL MEDICINE

## 2024-01-24 PROCEDURE — 45380 COLONOSCOPY AND BIOPSY: CPT | Performed by: INTERNAL MEDICINE

## 2024-01-24 PROCEDURE — 88305 TISSUE EXAM BY PATHOLOGIST: CPT | Performed by: SPECIALIST

## 2024-01-24 RX ORDER — LIDOCAINE HYDROCHLORIDE 20 MG/ML
INJECTION, SOLUTION EPIDURAL; INFILTRATION; INTRACAUDAL; PERINEURAL AS NEEDED
Status: DISCONTINUED | OUTPATIENT
Start: 2024-01-24 | End: 2024-01-24

## 2024-01-24 RX ORDER — SODIUM CHLORIDE, SODIUM LACTATE, POTASSIUM CHLORIDE, CALCIUM CHLORIDE 600; 310; 30; 20 MG/100ML; MG/100ML; MG/100ML; MG/100ML
INJECTION, SOLUTION INTRAVENOUS CONTINUOUS PRN
Status: DISCONTINUED | OUTPATIENT
Start: 2024-01-24 | End: 2024-01-24

## 2024-01-24 RX ORDER — PROPOFOL 10 MG/ML
INJECTION, EMULSION INTRAVENOUS AS NEEDED
Status: DISCONTINUED | OUTPATIENT
Start: 2024-01-24 | End: 2024-01-24

## 2024-01-24 RX ORDER — PANTOPRAZOLE SODIUM 40 MG/1
40 TABLET, DELAYED RELEASE ORAL DAILY
Qty: 30 TABLET | Refills: 3 | Status: SHIPPED | OUTPATIENT
Start: 2024-01-24

## 2024-01-24 RX ADMIN — PROPOFOL 50 MG: 10 INJECTION, EMULSION INTRAVENOUS at 11:47

## 2024-01-24 RX ADMIN — PROPOFOL 50 MG: 10 INJECTION, EMULSION INTRAVENOUS at 11:58

## 2024-01-24 RX ADMIN — PROPOFOL 50 MG: 10 INJECTION, EMULSION INTRAVENOUS at 11:55

## 2024-01-24 RX ADMIN — PROPOFOL 50 MG: 10 INJECTION, EMULSION INTRAVENOUS at 12:09

## 2024-01-24 RX ADMIN — PROPOFOL 50 MG: 10 INJECTION, EMULSION INTRAVENOUS at 12:05

## 2024-01-24 RX ADMIN — PROPOFOL 50 MG: 10 INJECTION, EMULSION INTRAVENOUS at 11:52

## 2024-01-24 RX ADMIN — PROPOFOL 50 MG: 10 INJECTION, EMULSION INTRAVENOUS at 11:49

## 2024-01-24 RX ADMIN — PROPOFOL 50 MG: 10 INJECTION, EMULSION INTRAVENOUS at 12:01

## 2024-01-24 RX ADMIN — PROPOFOL 100 MG: 10 INJECTION, EMULSION INTRAVENOUS at 11:45

## 2024-01-24 RX ADMIN — LIDOCAINE HYDROCHLORIDE 100 MG: 20 INJECTION, SOLUTION EPIDURAL; INFILTRATION; INTRACAUDAL; PERINEURAL at 11:45

## 2024-01-24 RX ADMIN — SODIUM CHLORIDE, SODIUM LACTATE, POTASSIUM CHLORIDE, AND CALCIUM CHLORIDE: .6; .31; .03; .02 INJECTION, SOLUTION INTRAVENOUS at 11:44

## 2024-01-24 NOTE — H&P
"History and Physical -  [facility]  Ash Da Silva 43 y.o. male MRN: [id number]    HPI: Ash Da Silva is a 43 y.o. year old male who presents with GERD and colon cancer screening.       Review of Systems    Historical Information   Past Medical History:   Diagnosis Date    Hyperlipidemia      History reviewed. No pertinent surgical history.  Social History   Social History     Substance and Sexual Activity   Alcohol Use Yes    Comment: social     Social History     Substance and Sexual Activity   Drug Use No     Social History     Tobacco Use   Smoking Status Never   Smokeless Tobacco Never     History reviewed. No pertinent family history.    Meds/Allergies     (Not in a hospital admission)      No Known Allergies    Objective     /78   Pulse 61   Temp (!) 96.7 °F (35.9 °C) (Temporal)   Resp 18   Ht 5' 8\" (1.727 m)   Wt 77.1 kg (170 lb)   SpO2 100%   BMI 25.85 kg/m²       PHYSICAL EXAM    Gen: NAD  CV: RRR  CHEST: Clear  ABD: soft, NT/ND  EXT: no edema  Neuro: AAO      ASSESSMENT/PLAN:  This is a 43 y.o. year old male here for GERD and colon cancer screening.     PLAN:   Procedure: egd/colonoscopy      "

## 2024-01-24 NOTE — ANESTHESIA PREPROCEDURE EVALUATION
Procedure:  COLONOSCOPY  EGD    Relevant Problems   GI/HEPATIC   (+) Gastroesophageal reflux disease without esophagitis        Physical Exam    Airway    Mallampati score: II  TM Distance: >3 FB  Neck ROM: full     Dental   No notable dental hx     Cardiovascular      Pulmonary      Other Findings        Anesthesia Plan  ASA Score- 2     Anesthesia Type- IV sedation with anesthesia with ASA Monitors.         Additional Monitors:     Airway Plan:            Plan Factors-Exercise tolerance (METS): >4 METS.    Chart reviewed.    Patient summary reviewed.    Patient is not a current smoker.              Induction- intravenous.    Postoperative Plan-     Informed Consent- Anesthetic plan and risks discussed with patient.  I personally reviewed this patient with the CRNA. Discussed and agreed on the Anesthesia Plan with the CRNA..

## 2024-01-24 NOTE — ANESTHESIA POSTPROCEDURE EVALUATION
Post-Op Assessment Note    CV Status:  Stable  Pain Score: 0    Pain management: adequate       Mental Status:  Sleepy   Hydration Status:  Euvolemic   PONV Controlled:  Controlled   Airway Patency:  Patent     Post Op Vitals Reviewed: Yes    No anethesia notable event occurred.    Staff: CRNA               BP   95/67   Temp 97.6   Pulse 64   Resp 14   SpO2 96

## 2024-01-26 PROCEDURE — 88305 TISSUE EXAM BY PATHOLOGIST: CPT | Performed by: SPECIALIST

## 2024-01-30 ENCOUNTER — TELEPHONE (OUTPATIENT)
Age: 44
End: 2024-01-30

## 2024-01-30 NOTE — TELEPHONE ENCOUNTER
Caller: Self    Doctor: Priscilla    Reason for call: Patient would like to change surgery date if possible    Call back#: [phone]

## 2024-01-30 NOTE — TELEPHONE ENCOUNTER
Rescheduled Dr. Da Silva sx to 4/17/24 and his post op is now 4/25 @ 1pm, he is aware to start PT on 4/22.

## 2024-01-31 DIAGNOSIS — R19.8 ABNORMAL FINDINGS ON ESOPHAGOGASTRODUODENOSCOPY (EGD): Primary | ICD-10-CM

## 2024-02-23 DIAGNOSIS — Z11.1 SCREENING EXAMINATION FOR PULMONARY TUBERCULOSIS: Primary | ICD-10-CM

## 2024-03-15 ENCOUNTER — TELEPHONE (OUTPATIENT)
Dept: GASTROENTEROLOGY | Facility: CLINIC | Age: 44
End: 2024-03-15

## 2024-03-15 ENCOUNTER — PREP FOR PROCEDURE (OUTPATIENT)
Dept: GASTROENTEROLOGY | Facility: CLINIC | Age: 44
End: 2024-03-15

## 2024-03-15 DIAGNOSIS — K20.0 EOSINOPHILIC ESOPHAGITIS: ICD-10-CM

## 2024-03-15 DIAGNOSIS — K90.0 CELIAC SPRUE: Primary | ICD-10-CM

## 2024-03-15 NOTE — TELEPHONE ENCOUNTER
Left message for pt reminding him to get blood work done and to call and schedule EGD with Dr. Mcknight in April or May. Order is in chart. Will call him again if he doesn't return call.

## 2024-03-15 NOTE — TELEPHONE ENCOUNTER
----- Message from Spike Mcknight MD sent at 3/15/2024  9:09 AM EDT -----  Please schedule patient for repeat upper endoscopy, I put the order in back in January.    When you call him, please remind him to check celiac blood work.

## 2024-03-15 NOTE — TELEPHONE ENCOUNTER
Scheduled date of EGD(as of today): 6/19/24    Physician performing EGD: Dr. Mcknight    Location of EGD: [facility]

## 2024-03-21 ENCOUNTER — TELEPHONE (OUTPATIENT)
Age: 44
End: 2024-03-21

## 2024-03-21 NOTE — TELEPHONE ENCOUNTER
Caller: Patient    Doctor: Priscilla    Reason for call:      is calling about his surgery for 4/17/24 and his post Op date, rescreduled Post Op date to 4/29/24.  Please call him to confirm all these dates for his surgery that everything is good for the surgery dated.    Call back#: [phone]

## 2024-03-22 NOTE — TELEPHONE ENCOUNTER
I spoke to patient I rescheduled his PO visit to 4/22/24. I will reach back out to patient on Monday I need to make sure we have his consent. I will go over PT and surgery instructions.

## 2024-03-25 NOTE — TELEPHONE ENCOUNTER
I spoke with the patient and explained Dr. Wells will go over the consent morning of surgery with him. I will mail him the soap and pamphlet with surgery instructions.

## 2024-04-03 ENCOUNTER — ANESTHESIA EVENT (OUTPATIENT)
Dept: PERIOP | Facility: AMBULARY SURGERY CENTER | Age: 44
End: 2024-04-03
Payer: COMMERCIAL

## 2024-04-04 DIAGNOSIS — S93.401A MODERATE RIGHT ANKLE SPRAIN, INITIAL ENCOUNTER: Primary | ICD-10-CM

## 2024-04-05 NOTE — PRE-PROCEDURE INSTRUCTIONS
Pre-Surgery Instructions:   Medication Instructions    pantoprazole (PROTONIX) 40 mg tablet Take day of surgery.    rosuvastatin (CRESTOR) 10 MG tablet Take day of surgery.   Medication instructions for day surgery reviewed. Please use only a sip of water to take your instructed medications. Avoid all over the counter vitamins, supplements and NSAIDS for one week prior to surgery per anesthesia guidelines. Tylenol is ok to take as needed.     You will receive a call one business day prior to surgery with an arrival time and hospital directions. If your surgery is scheduled on a Monday, the hospital will be calling you on the Friday prior to your surgery.  Do not eat or drink anything after midnight the night before your surgery, including candy, mints, lifesavers, or chewing gum. Do not drink alcohol 24hrs before your surgery. Try not to smoke at least 24hrs before your surgery.       Follow the pre surgery showering instructions as listed in the “My Surgical Experience Booklet” or otherwise provided by your surgeon's office. Do not use a blade to shave the surgical area 1 week before surgery. It is okay to use a clean electric clippers up to 24 hours before surgery. Do not apply any lotions, creams, including makeup, cologne, deodorant, or perfumes after showering on the day of your surgery. Do not use dry shampoo, hair spray, hair gel, or any type of hair products.     No contact lenses, eye make-up, or artificial eyelashes. Remove nail polish, including gel polish, and any artificial, gel, or acrylic nails if possible. Remove all jewelry including rings and body piercing jewelry.     Wear causal clothing that is easy to take on and off. Consider your type of surgery.    Keep any valuables, jewelry, piercings at home. Please bring any specially ordered equipment (sling, braces) if indicated.    Arrange for a responsible person to drive you to and from the hospital on the day of your surgery. Please confirm the  visitor policy for the day of your procedure when you receive your phone call with an arrival time.     Call the surgeon's office with any new illnesses, exposures, or additional questions prior to surgery.    Please reference your “My Surgical Experience Booklet” for additional information to prepare for your upcoming surgery. Bring sling

## 2024-04-08 ENCOUNTER — APPOINTMENT (OUTPATIENT)
Dept: LAB | Facility: CLINIC | Age: 44
End: 2024-04-08
Payer: COMMERCIAL

## 2024-04-08 DIAGNOSIS — M79.10 MYALGIA: Primary | ICD-10-CM

## 2024-04-08 DIAGNOSIS — R19.8 ABNORMAL FINDINGS ON ESOPHAGOGASTRODUODENOSCOPY (EGD): ICD-10-CM

## 2024-04-08 DIAGNOSIS — E78.5 DYSLIPIDEMIA: ICD-10-CM

## 2024-04-08 DIAGNOSIS — M79.10 MYALGIA: ICD-10-CM

## 2024-04-08 DIAGNOSIS — Z11.1 SCREENING EXAMINATION FOR PULMONARY TUBERCULOSIS: ICD-10-CM

## 2024-04-08 LAB
ALBUMIN SERPL BCP-MCNC: 4.5 G/DL (ref 3.5–5)
ALP SERPL-CCNC: 63 U/L (ref 34–104)
ALT SERPL W P-5'-P-CCNC: 54 U/L (ref 7–52)
ANION GAP SERPL CALCULATED.3IONS-SCNC: 6 MMOL/L (ref 4–13)
AST SERPL W P-5'-P-CCNC: 31 U/L (ref 13–39)
BILIRUB SERPL-MCNC: 1.13 MG/DL (ref 0.2–1)
BUN SERPL-MCNC: 16 MG/DL (ref 5–25)
CALCIUM SERPL-MCNC: 9.6 MG/DL (ref 8.4–10.2)
CHLORIDE SERPL-SCNC: 104 MMOL/L (ref 96–108)
CK SERPL-CCNC: 306 U/L (ref 39–308)
CO2 SERPL-SCNC: 31 MMOL/L (ref 21–32)
CREAT SERPL-MCNC: 0.98 MG/DL (ref 0.6–1.3)
GFR SERPL CREATININE-BSD FRML MDRD: 92 ML/MIN/1.73SQ M
GLUCOSE SERPL-MCNC: 86 MG/DL (ref 65–140)
POTASSIUM SERPL-SCNC: 4.3 MMOL/L (ref 3.5–5.3)
PROT SERPL-MCNC: 6.9 G/DL (ref 6.4–8.4)
SODIUM SERPL-SCNC: 141 MMOL/L (ref 135–147)

## 2024-04-08 PROCEDURE — 86480 TB TEST CELL IMMUN MEASURE: CPT

## 2024-04-08 PROCEDURE — 36415 COLL VENOUS BLD VENIPUNCTURE: CPT

## 2024-04-08 PROCEDURE — 82550 ASSAY OF CK (CPK): CPT

## 2024-04-08 PROCEDURE — 82784 ASSAY IGA/IGD/IGG/IGM EACH: CPT

## 2024-04-08 PROCEDURE — 80053 COMPREHEN METABOLIC PANEL: CPT

## 2024-04-08 PROCEDURE — 86231 EMA EACH IG CLASS: CPT

## 2024-04-08 PROCEDURE — 86258 DGP ANTIBODY EACH IG CLASS: CPT

## 2024-04-08 PROCEDURE — 86364 TISS TRNSGLTMNASE EA IG CLAS: CPT

## 2024-04-09 LAB
GAMMA INTERFERON BACKGROUND BLD IA-ACNC: 0.07 IU/ML
M TB IFN-G BLD-IMP: NEGATIVE
M TB IFN-G CD4+ BCKGRND COR BLD-ACNC: 0.06 IU/ML
M TB IFN-G CD4+ BCKGRND COR BLD-ACNC: 0.1 IU/ML
MITOGEN IGNF BCKGRD COR BLD-ACNC: 9.93 IU/ML

## 2024-04-10 ENCOUNTER — HOSPITAL ENCOUNTER (OUTPATIENT)
Dept: RADIOLOGY | Facility: HOSPITAL | Age: 44
Discharge: HOME | End: 2024-04-10
Payer: COMMERCIAL

## 2024-04-10 DIAGNOSIS — S93.401A MODERATE RIGHT ANKLE SPRAIN, INITIAL ENCOUNTER: ICD-10-CM

## 2024-04-10 LAB
ENDOMYSIUM IGA SER QL: POSITIVE
GLIADIN PEPTIDE IGA SER-ACNC: 33 UNITS (ref 0–19)
GLIADIN PEPTIDE IGG SER-ACNC: 45 UNITS (ref 0–19)
IGA SERPL-MCNC: 198 MG/DL (ref 90–386)
TTG IGA SER-ACNC: 58 U/ML (ref 0–3)
TTG IGG SER-ACNC: 4 U/ML (ref 0–5)

## 2024-04-10 PROCEDURE — 73610 X-RAY EXAM OF ANKLE: CPT

## 2024-04-15 DIAGNOSIS — E78.5 DYSLIPIDEMIA: Primary | ICD-10-CM

## 2024-04-16 NOTE — ANESTHESIA PREPROCEDURE EVALUATION
Procedure:  SHOULDER ARTHROSCOPIC ROTATOR CUFF REPAIR (Left: Shoulder)    Relevant Problems   GI/HEPATIC   (+) Gastroesophageal reflux disease without esophagitis        Physical Exam    Airway    Mallampati score: II  TM Distance: >3 FB  Neck ROM: full     Dental       Cardiovascular      Pulmonary      Other Findings        Anesthesia Plan  ASA Score- 2     Anesthesia Type- general with ASA Monitors.         Additional Monitors:     Airway Plan: LMA.    Comment: IS Block with Exparel for post op pain per surgeon request. .       Plan Factors-    Chart reviewed.                      Induction- intravenous.    Postoperative Plan-     Informed Consent- Anesthetic plan and risks discussed with patient.  I personally reviewed this patient with the CRNA. Discussed and agreed on the Anesthesia Plan with the CRNA..

## 2024-04-17 ENCOUNTER — HOSPITAL ENCOUNTER (OUTPATIENT)
Facility: AMBULARY SURGERY CENTER | Age: 44
Setting detail: OUTPATIENT SURGERY
Discharge: HOME | End: 2024-04-17
Attending: ORTHOPAEDIC SURGERY | Admitting: ORTHOPAEDIC SURGERY
Payer: COMMERCIAL

## 2024-04-17 ENCOUNTER — ANESTHESIA (OUTPATIENT)
Dept: PERIOP | Facility: AMBULARY SURGERY CENTER | Age: 44
End: 2024-04-17
Payer: COMMERCIAL

## 2024-04-17 VITALS
RESPIRATION RATE: 21 BRPM | WEIGHT: 176 LBS | TEMPERATURE: 96.7 F | OXYGEN SATURATION: 97 % | HEART RATE: 65 BPM | SYSTOLIC BLOOD PRESSURE: 123 MMHG | BODY MASS INDEX: 26.76 KG/M2 | DIASTOLIC BLOOD PRESSURE: 67 MMHG

## 2024-04-17 DIAGNOSIS — M75.102 TEAR OF LEFT SUPRASPINATUS TENDON: Primary | ICD-10-CM

## 2024-04-17 PROCEDURE — 29827 SHO ARTHRS SRG RT8TR CUF RPR: CPT | Performed by: ORTHOPAEDIC SURGERY

## 2024-04-17 PROCEDURE — C1713 ANCHOR/SCREW BN/BN,TIS/BN: HCPCS | Performed by: ORTHOPAEDIC SURGERY

## 2024-04-17 PROCEDURE — 29826 SHO ARTHRS SRG DECOMPRESSION: CPT | Performed by: PHYSICIAN ASSISTANT

## 2024-04-17 PROCEDURE — NC001 PR NO CHARGE: Performed by: ORTHOPAEDIC SURGERY

## 2024-04-17 PROCEDURE — C9290 INJ, BUPIVACAINE LIPOSOME: HCPCS | Performed by: ANESTHESIOLOGY

## 2024-04-17 PROCEDURE — 29828 SHO ARTHRS SRG BICP TENODSIS: CPT | Performed by: ORTHOPAEDIC SURGERY

## 2024-04-17 PROCEDURE — 29828 SHO ARTHRS SRG BICP TENODSIS: CPT | Performed by: PHYSICIAN ASSISTANT

## 2024-04-17 PROCEDURE — 29827 SHO ARTHRS SRG RT8TR CUF RPR: CPT | Performed by: PHYSICIAN ASSISTANT

## 2024-04-17 PROCEDURE — 29826 SHO ARTHRS SRG DECOMPRESSION: CPT | Performed by: ORTHOPAEDIC SURGERY

## 2024-04-17 DEVICE — SP FIBERTAK RC, DBLOAD TAPE BL/W, BLK/W
Type: IMPLANTABLE DEVICE | Site: SHOULDER | Status: FUNCTIONAL
Brand: ARTHREX®

## 2024-04-17 DEVICE — BIO-COMP SWVLK C, CLD 4.75X19.1MM
Type: IMPLANTABLE DEVICE | Site: SHOULDER | Status: FUNCTIONAL
Brand: ARTHREX®

## 2024-04-17 RX ORDER — MIDAZOLAM HYDROCHLORIDE 2 MG/2ML
INJECTION, SOLUTION INTRAMUSCULAR; INTRAVENOUS AS NEEDED
Status: DISCONTINUED | OUTPATIENT
Start: 2024-04-17 | End: 2024-04-17

## 2024-04-17 RX ORDER — ONDANSETRON 2 MG/ML
4 INJECTION INTRAMUSCULAR; INTRAVENOUS EVERY 6 HOURS PRN
Status: DISCONTINUED | OUTPATIENT
Start: 2024-04-17 | End: 2024-04-17 | Stop reason: HOSPADM

## 2024-04-17 RX ORDER — OXYCODONE HYDROCHLORIDE 5 MG/1
TABLET ORAL
Qty: 13 TABLET | Refills: 0 | Status: SHIPPED | OUTPATIENT
Start: 2024-04-17

## 2024-04-17 RX ORDER — DEXAMETHASONE SODIUM PHOSPHATE 10 MG/ML
INJECTION, SOLUTION INTRAMUSCULAR; INTRAVENOUS AS NEEDED
Status: DISCONTINUED | OUTPATIENT
Start: 2024-04-17 | End: 2024-04-17

## 2024-04-17 RX ORDER — OXYCODONE HYDROCHLORIDE 5 MG/1
5 TABLET ORAL EVERY 4 HOURS PRN
Status: DISCONTINUED | OUTPATIENT
Start: 2024-04-17 | End: 2024-04-17 | Stop reason: HOSPADM

## 2024-04-17 RX ORDER — FENTANYL CITRATE/PF 50 MCG/ML
50 SYRINGE (ML) INJECTION
Status: DISCONTINUED | OUTPATIENT
Start: 2024-04-17 | End: 2024-04-17 | Stop reason: HOSPADM

## 2024-04-17 RX ORDER — PROPOFOL 10 MG/ML
INJECTION, EMULSION INTRAVENOUS AS NEEDED
Status: DISCONTINUED | OUTPATIENT
Start: 2024-04-17 | End: 2024-04-17

## 2024-04-17 RX ORDER — FENTANYL CITRATE 50 UG/ML
INJECTION, SOLUTION INTRAMUSCULAR; INTRAVENOUS AS NEEDED
Status: DISCONTINUED | OUTPATIENT
Start: 2024-04-17 | End: 2024-04-17

## 2024-04-17 RX ORDER — ONDANSETRON 2 MG/ML
4 INJECTION INTRAMUSCULAR; INTRAVENOUS ONCE AS NEEDED
Status: DISCONTINUED | OUTPATIENT
Start: 2024-04-17 | End: 2024-04-17 | Stop reason: HOSPADM

## 2024-04-17 RX ORDER — LIDOCAINE HYDROCHLORIDE 20 MG/ML
INJECTION, SOLUTION EPIDURAL; INFILTRATION; INTRACAUDAL; PERINEURAL AS NEEDED
Status: DISCONTINUED | OUTPATIENT
Start: 2024-04-17 | End: 2024-04-17

## 2024-04-17 RX ORDER — CEFAZOLIN SODIUM 2 G/50ML
2000 SOLUTION INTRAVENOUS ONCE
Status: COMPLETED | OUTPATIENT
Start: 2024-04-17 | End: 2024-04-17

## 2024-04-17 RX ORDER — METOCLOPRAMIDE HYDROCHLORIDE 5 MG/ML
10 INJECTION INTRAMUSCULAR; INTRAVENOUS ONCE AS NEEDED
Status: DISCONTINUED | OUTPATIENT
Start: 2024-04-17 | End: 2024-04-17 | Stop reason: HOSPADM

## 2024-04-17 RX ORDER — ACETAMINOPHEN 325 MG/1
650 TABLET ORAL EVERY 6 HOURS PRN
Status: DISCONTINUED | OUTPATIENT
Start: 2024-04-17 | End: 2024-04-17 | Stop reason: HOSPADM

## 2024-04-17 RX ORDER — BUPIVACAINE HYDROCHLORIDE 5 MG/ML
INJECTION, SOLUTION EPIDURAL; INTRACAUDAL AS NEEDED
Status: DISCONTINUED | OUTPATIENT
Start: 2024-04-17 | End: 2024-04-17

## 2024-04-17 RX ORDER — ONDANSETRON 2 MG/ML
INJECTION INTRAMUSCULAR; INTRAVENOUS AS NEEDED
Status: DISCONTINUED | OUTPATIENT
Start: 2024-04-17 | End: 2024-04-17

## 2024-04-17 RX ORDER — SODIUM CHLORIDE, SODIUM LACTATE, POTASSIUM CHLORIDE, CALCIUM CHLORIDE 600; 310; 30; 20 MG/100ML; MG/100ML; MG/100ML; MG/100ML
INJECTION, SOLUTION INTRAVENOUS CONTINUOUS PRN
Status: DISCONTINUED | OUTPATIENT
Start: 2024-04-17 | End: 2024-04-17

## 2024-04-17 RX ADMIN — BUPIVACAINE 20 ML: 13.3 INJECTION, SUSPENSION, LIPOSOMAL INFILTRATION at 07:17

## 2024-04-17 RX ADMIN — PROPOFOL 140 MCG/KG/MIN: 10 INJECTION, EMULSION INTRAVENOUS at 07:48

## 2024-04-17 RX ADMIN — CEFAZOLIN SODIUM 2000 MG: 2 SOLUTION INTRAVENOUS at 07:41

## 2024-04-17 RX ADMIN — ONDANSETRON 4 MG: 2 INJECTION INTRAMUSCULAR; INTRAVENOUS at 07:45

## 2024-04-17 RX ADMIN — SODIUM CHLORIDE, SODIUM LACTATE, POTASSIUM CHLORIDE, AND CALCIUM CHLORIDE: .6; .31; .03; .02 INJECTION, SOLUTION INTRAVENOUS at 08:32

## 2024-04-17 RX ADMIN — DEXAMETHASONE SODIUM PHOSPHATE 10 MG: 10 INJECTION INTRAMUSCULAR; INTRAVENOUS at 07:45

## 2024-04-17 RX ADMIN — FENTANYL CITRATE 25 MCG: 50 INJECTION INTRAMUSCULAR; INTRAVENOUS at 07:55

## 2024-04-17 RX ADMIN — PROPOFOL 150 MG: 10 INJECTION, EMULSION INTRAVENOUS at 07:45

## 2024-04-17 RX ADMIN — BUPIVACAINE HYDROCHLORIDE 7.5 ML: 5 INJECTION, SOLUTION EPIDURAL; INTRACAUDAL at 07:17

## 2024-04-17 RX ADMIN — LIDOCAINE HYDROCHLORIDE 100 MG: 20 INJECTION, SOLUTION EPIDURAL; INFILTRATION; INTRACAUDAL at 07:45

## 2024-04-17 RX ADMIN — PROPOFOL 50 MG: 10 INJECTION, EMULSION INTRAVENOUS at 07:52

## 2024-04-17 RX ADMIN — MIDAZOLAM 2 MG: 1 INJECTION INTRAMUSCULAR; INTRAVENOUS at 07:15

## 2024-04-17 RX ADMIN — SODIUM CHLORIDE, SODIUM LACTATE, POTASSIUM CHLORIDE, AND CALCIUM CHLORIDE: .6; .31; .03; .02 INJECTION, SOLUTION INTRAVENOUS at 07:35

## 2024-04-17 NOTE — DISCHARGE INSTR - AVS FIRST PAGE
You are being scheduled for a shoulder arthroscopy to treat your symptoms.  Below are some instructions and information on what to expect before and after your surgery.        Pre-Surgical Preparation for Arthroscopic Shoulder Surgery:     You will be contacted the evening prior to your surgery to confirm the scheduled time of the procedure and when to arrive at the hospital.   Do not eat or drink anything after midnight the night before your surgery.  Since you are having out-patient surgery, make sure that you have someone who can drive you home later in the day.  Also, prepare that person for a long day, as the process of safely preparing for and recovering from the procedure is more time consuming than the actual procedure!      As you will be in a sling after surgery, please wear or bring a loose fitting button-down shirt so that you can easily place this over the sling when you leave the surgical suite.  This avoids having to place the operative arm in a sleeve.  Most patients find that this is the easiest outfit to wear for the first week or so after surgery so you may want to plan accordingly.    Most patients find that lying down in bed after shoulder surgery accentuates their discomfort.  This is likely related to the effect of gravity on the swelling in the shoulder.  As a result, most patients sleep better in a recliner or in bed with pillows propped up behind their back for the first few days or weeks after surgery.  It is a good idea to plan for this ahead of time so there will be less hassle getting things set up the night after surgery.       What to Expect After Arthroscopic Shoulder Surgery:    It is normal to have swelling and discomfort in the shoulder for several days or a week after surgery.  It is also normal to have a small amount of drainage from the surgical wounds (especially the first few days after surgery), as we put fluid into the shoulder to visualize the structures during surgery.   "It is NOT normal to have foul smelling, purulent drainage and if this is noted, please contact the office immediately or proceed to the emergency room for evaluation as this may indicate an infection.     Applying ice bags to the shoulder may help with pain that is not controlled by the regional block.  Ice should be applied 20-30 minutes at a time, every hour or two.  Make sure to put a thin towel or T-shirt next to your skin to avoid direct contact of the ice with the skin. Icing is most helpful in the first 48 hours, although many people find that continuing past this time frame lessens their postoperative pain.  Please note that your post-operative dressing is not conductive to ice, so if you need to, it is okay to remove that dressing even the night after surgery and place band-aids over the wounds in order for the ice to take effect.     Pain Control    Most patients will receive a nerve block, the local anesthetic may keep your whole arm numb for up to 4 days.  You will be given a prescription for narcotic pain medication when you are discharged from the hospital.  With the newer nerve block that is being utilized, patients are rarely requiring the use of this narcotic pain medication.  If you find you do not tolerate that type of pain medicine well, call our office and we will try another one.     In addition to the narcotic pain medication, it is safe to use an anti-inflammatory (unless the patient has a medical condition that would not allow safe use of this mediation).  This includes the Advil, Motrin, Ibuprofen and Alleve category of medications.  Simply follow the over the counter dosing on the package and use as indicated as another adjunct.  Importantly since these medications are all very similar, use only one of them.  Tylenol is a separate medication that can be utilized as well and can be taken at the same time as the other medication or given in a \"staggered\" manner.  Just make sure that you " follow the dosing on the over the counter bottle instructions.  Also make sure that the pain medication prescribed by Dr Wells's team does not contain acetaminophen (this is found in Percocet and Vicodin).   Typically we do not prescribe those types of pain medications but if for some reason that has been prescribed DO NOT add more Tylenol (acetaminophen) as you could end up taking too much of that medication.    As mentioned above, most patients find that lying down accentuates their discomfort. You might sleep better in a recliner, or propped up in bed.     Dr. Wells encourages patients to safely ambulate around the house as much as possible in the first few days after the procedure as this can help with blood circulation in the legs. While the incidence of blood clots is very rare following shoulder surgery, early ambulation is a great way to help decrease the already low rate.    24 hours after the surgery you may remove the bandage and cover incisions with Band-Aids if needed. At that time you may shower, the wounds will have a surgical glue that will protect them from shower water but do not submerge your incisions directly (bathing or swimming) until at least 2 weeks post-operatively.  It is safe to let the arm hang at the side and take a shower and put on a shirt without the sling on.  Just make sure that you do not use the operative are to reach out and grab anything as that may damage the repair.  When you are done showering and getting dressed please return the operative arm to the sling.    Unless noted otherwise in your discharge paperwork, Dr. Wells uses absorbable sutures so they do not need to be removed.    Dr. Wells’s physician assistant (PA) will see you in the office a few days after the procedure to review the intra-operative findings and to initiate physical therapy if appropriate.  A post-operative appointment should have been scheduled for you already, but if for some reason this did  not happen, please call the office to make one.     Physical therapy is important after nearly all shoulder surgeries and a detailed rehabilitation plan based on the specific intra-operative findings and procedures will be provided to your therapist at the first post-operative office visit.  Most patients have post-operative therapy appointments scheduled pre-operatively, but if you do not, that will be handled at the first post-operative office visit.  Unless expressly directed otherwise it is safe to remove the sling even the first day after the surgery and let the arm hang by the side.  This allows patients to shower and even put a shirt on (bad arm in the sleeve first).  It is also safe to flex and extend their wrist, hand and fingers as much as possible when the block wears off.  These simple motions can serve to pump fluid out of the forearm and decrease swelling in the arm.

## 2024-04-17 NOTE — ANESTHESIA POSTPROCEDURE EVALUATION
Post-Op Assessment Note    CV Status:  Stable  Pain Score: 0    Pain management: adequate       Mental Status:  Awake and alert   Hydration Status:  Stable   PONV Controlled:  None   Airway Patency:  Patent and adequate     Post Op Vitals Reviewed: Yes    No anethesia notable event occurred.    Staff: CRNA, Anesthesiologist               BP 94/57 (04/17/24 0845)    Temp (!) 96.5 °F (35.8 °C) (04/17/24 0845)    Pulse 61 (04/17/24 0845)   Resp (!) 11 (04/17/24 0845)    SpO2 97 % (04/17/24 0845)

## 2024-04-17 NOTE — H&P
I identified and marked the patient in the pre-op holding area after confirming the surgical consent.  No changes to medical health since the H&P was preformed.     The patient's [facility] to ensure compliance with applicable state laws.      Assessment  Diagnoses and all orders for this visit:     Tear of left supraspinatus tendon           Discussion and Plan:     The patient continues to be symptomatic for his left shoulder supraspinatus tendon tear and is failed to improve with appropriate nonoperative care.  He does not have pain at this time but does demonstrate significant weakness and does feel the weakness is impeding his ability to do activities he wishes to do therefore he is indicated at this time to consider arthroscopic rotator cuff pair of his left shoulder.  We have discussed this at length in the past and he is scheduled to have this performed at the end of March, he may have to adjust that based on a vacation coming up at that time and I recommended he likely have the procedure performed following the vacation so that he can enjoy his time on vacation without having to wear a sling.  He will consider timing and be scheduled accordingly.  Just to be sure we did again review risks and benefits as below and he is scheduled for arthroscopic rotator cuff pair of left shoulder at his convenience.        A thorough discussion was performed with the patient regarding the risks and benefit of operative and nonoperative treatment of their rotator cuff tear. Risks discussed include but were not limited to infection, neurovascular injury, recurrent tear, nonhealing of the repair, need for further surgery, need for biceps tenodesis or tenotomy, stiffness, need for prolonged rehabilitation, as well as the risk of anesthesia. After this discussion all questions were answered and informed consent was obtained in the office for arthroscopic right  shoulder rotator cuff repair.     Subjective:   Patient ID: Ash Da Silva is a 43 y.o. male        HPI     Patient returns for follow-up of his left shoulder supraspinatus tendon tear.  He continues to be limited with strength and this is affecting his activities of daily living as well as activities he wishes to perform.  He did have a new injury while playing hockey which aggravated his shoulder but he does state he is pain-free today.  He is scheduled to have the shoulder fixed in late March.     The following portions of the patient's history were reviewed and updated as appropriate: allergies, current medications, past family history, past medical history, past social history, past surgical history and problem list.           Objective:  /74   Pulse 60   Temp (!) 97 °F (36.1 °C) (Temporal)   Resp 18   Wt 79.8 kg (176 lb)   SpO2 97%   BMI 26.76 kg/m²           Left Shoulder Exam      Tenderness   The patient is experiencing no tenderness.      Range of Motion   Active abduction:  90   External rotation:  70   Forward flexion:  170      Muscle Strength   Abduction: 4/5   Internal rotation: 4/5   External rotation: 4/5   Supraspinatus: 4/5      Tests   Rivas test: negative  Impingement: positive  Drop arm: negative     Other   Erythema: absent  Scars: absent  Sensation: normal  Pulse: present                  Physical Exam  Vitals and nursing note reviewed.   Constitutional:       Appearance: He is well-developed.   HENT:      Head: Normocephalic and atraumatic.   Cardiovascular:      Rate and Rhythm: Normal rate and regular rhythm.      Pulses: Normal pulses.      Heart sounds: Normal heart sounds.   Pulmonary:      Effort: Pulmonary effort is normal. No respiratory distress.     Sounds: Normal   Abdominal:      General: There is no distension.      Palpations: Abdomen is soft.   Musculoskeletal:      Cervical back: Normal range of motion and neck supple.   Skin:     General: Skin is warm and dry.    Neurological:      Mental Status: He is alert and oriented to person, place, and time.   Psychiatric:         Mood and Affect: Mood normal.         Behavior: Behavior normal.               I have personally reviewed pertinent films in PACS and my interpretation is as follows.     MRI scan left shoulder shows a near full-thickness anterior supraspinatus tendon tear with no retraction and no atrophy

## 2024-04-17 NOTE — OP NOTE
OPERATIVE REPORT  PATIENT NAME: Ash Da Silva    :  1980  MRN: [id number]  Pt Location: [facility] OR ROOM 06    SURGERY DATE: 2024     SURGEON: Pratik Wells MD     ASSISTANT: Alejandra Pace PA-C     NOTE: Alejandra Pace PA-C was present throughout the entire procedure and performed essential assistance with patient prepping, draping, positioning, suture management, wound closure, sterile dressing application and sling application, all under my direct supervision.     NOTE: No qualified resident physician was available for assistance    PREOPERATIVE DIAGNOSIS:  Left Shoulder Supraspinatus Tear    POSTOPERATIVE DIAGNOSIS: Left Shoulder Supraspinatus Tear and Long Head Biceps Tendon Partial Tear    PROCEDURES: Surgical Arthroscopy Left Shoulder with Rotator Cuff Repair, Long Head Biceps Tenodesis, and Subacromial Decompression    ANESTHESIA STAFF: Joseph Saeed MD     ANESTHESIA TYPE: General LMA with ultrasound guided interscalene block (Exparel). The interscalene block was provided by the anesthesia staff per my request for postoperative pain control and to decrease the use of postoperative narcotic medication for pain control.    COMPLICATIONS: None    FINDINGS: Supraspinatus Tear and Long Head Biceps Tendon Partial Tear    SPECIMEN(S):  None    ESTIMATED BLOOD LOSS: Minimal    INDICATION:  Briefly, the patient is a 43 y.o.  male with left shoulder pain. MRI scan confirmed a full thickness supraspinatus tear. The patient elected for arthroscopic treatment. Informed consent was obtained after a thorough discussion of the risks and benefits of the procedure, as well as alternatives to the procedure.     OPERATIVE TECHNIQUE:  On the day of surgery, I identified the patient’s left shoulder and marked it with my initials. The patient was taken to the operating room where anesthesia was induced and 2 grams of IV Cefazolin were given. The patient was examined in the supine position and was found to  have full range of motion of the left shoulder with no instability. The patient was then positioned in the semilateral [facility] chair position. All bony prominences were padded. The shoulder was prepped and draped in normal sterile fashion. After a time-out for safety, a standard posterolateral arthroscopic portal was made. Glenohumeral evaluation revealed intact glenohumeral articular cartilage with no loose bodies. There was a full thickness supraspinatus tear as well as a partial tear of the long head of biceps which was unstable secondary to disruption of the biceps pulley.   The infraspinatus and subscapularis were intact, there was some degenerative changes of the superior labrum.   After the intra-articular evaluation was completed, the scope was then placed in the subacromial space through the same portal where a thorough bursectomy was performed. The full thickness supraspinatus tear was found to measure 1.4 cm from anterior to posterior, included the entire insertion of the supraspinatus and was able to be easily reduced to the tuberosity.  The tuberosity was prepared in routine fashion and a double row suture bridge configuration repair with one medial 2.6 mm double loaded Arthrex All-Suture anchor and one lateral 4.75 mm Arthrex BioComposite SwiveLock anchor using four horizontal mattress stiches through the tendon was performed achieving anatomic reduction of the rotator cuff tendon to the tuberosity. The long head of biceps tendon was indicated for tenodesis and it was incorporated into the rotator cuff repair by using the anterior limb of the most anterior anchor through and around the long head of biceps in a loop whipstitch fashion; the long head of biceps was then released. When the medial row of the rotator cuff repair was tied down, this incorporated the long head biceps tenodesis into our repair. The CA ligament was frayed so it was released off the anterolateral edge of acromion and a  gentle acromioplasty was performed. The area was then irrigated. Scope was withdrawn. Wounds were closed with 4-0 Monocryl and Histoacryl. Sterile dressings and a sling with an abduction pillow was placed. The patient was awoken without complication and returned to the recovery room in good condition. We will see the patient back in the office next week to initiate therapy following standard rotator cuff repair rehabilitation protocol. At the end of procedure, the counts were correct.     PATIENT DISPOSITION:  Stable to PACU      SIGNATURE: Pratik Wells MD  DATE: April 17, 2024  TIME: 8:40 AM

## 2024-04-17 NOTE — ANESTHESIA PROCEDURE NOTES
Peripheral Block    Patient location during procedure: holding area  Start time: 4/17/2024 7:17 AM  Reason for block: at surgeon's request and post-op pain management  Staffing  Performed by: Hola Greenberg MD  Authorized by: Hola Greenberg MD    Preanesthetic Checklist  Completed: patient identified, IV checked, site marked, risks and benefits discussed, surgical consent, monitors and equipment checked, pre-op evaluation and timeout performed  Peripheral Block  Patient position: supine  Prep: ChloraPrep  Patient monitoring: frequent blood pressure checks, continuous pulse oximetry and heart rate  Block type: Interscalene  Laterality: left  Injection technique: single-shot  Procedures: ultrasound guided, Ultrasound guidance required for the procedure to increase accuracy and safety of medication placement and decrease risk of complications.  Ultrasound permanent image saved  Needle  Needle type: Stimuplex   Needle gauge: 22 G  Needle length: 2 in  Needle localization: anatomical landmarks and ultrasound guidance  Assessment  Injection assessment: incremental injection, frequent aspiration, injected with ease, negative aspiration, negative for heart rate change, no paresthesia on injection, no symptoms of intraneural/intravenous injection and needle tip visualized at all times  Paresthesia pain: none  Post-procedure:  site cleaned  patient tolerated the procedure well with no immediate complications

## 2024-04-22 ENCOUNTER — OFFICE VISIT (OUTPATIENT)
Dept: OBGYN CLINIC | Facility: OTHER | Age: 44
End: 2024-04-22

## 2024-04-22 VITALS
DIASTOLIC BLOOD PRESSURE: 67 MMHG | SYSTOLIC BLOOD PRESSURE: 122 MMHG | HEART RATE: 64 BPM | HEIGHT: 68 IN | BODY MASS INDEX: 26.67 KG/M2 | WEIGHT: 176 LBS

## 2024-04-22 DIAGNOSIS — Z98.890 S/P SHOULDER SURGERY: Primary | ICD-10-CM

## 2024-04-22 PROCEDURE — 99024 POSTOP FOLLOW-UP VISIT: CPT | Performed by: PHYSICIAN ASSISTANT

## 2024-04-22 NOTE — PATIENT INSTRUCTIONS
Rotator Cuff Repair Rehabilitation Protocol  (adapted from Pedro WEN et al. “Rehabilitation of the Rotator Cuff: An Evaluation Based Approach”. JAAOS 2006; 14:599-609)  Updated 10.14  Pratik Wells MD  [facility]  [address]  [place] [address]  [phone]  Phase 1 (Weeks 0-6)   Immediate Postoperative Period   Goals:    Diminish Pain and Inflammation  Maintain and Protect Integrity of the Repair    Gradually Increase Passive ROM (NO Active or Active Assist until Week 6)    Become Independent with Modified ADLs   Precautions:  Maintain Arm in Abduction Sling, Remove Only for Directed Exercises (may remove Abduction Pillow after Day 21 for comfort)    Keep Incisions Clean & Dry (okay to shower in 48 hours, band-aids over incisions)  No Immersion (pool) until Wounds Totally Sealed (usually not prior to day #10)  Passive Shoulder Motion ONLY, No Lifting/Holding Objects, Reaching Behind Back  Okay to Type/Write at Desktop with Arm in Sling   Day 0-6    Elbow, Wrist, Hand AROM Exercises     Start Cervical AROM and Scapula Isometrics    Cryotherapy/Ice for Pain and Inflammation    Instruct in Hygiene, Posture, and Positioning    Day 7-28    Continue Above  May Start Pendulum Exercises    May Start Supine, Pain Free, PT assisted PROM  Forward Flexion to 90°, External Rotation to 35° (Elbow at side), Internal Rotation to Body, Abduction to 60°    Can Introduce light Cardio (Walking, Stationary Bike)    Aqua Therapy may begin at week 3 (day 21) as long as no wound problems   Day 29-42    Continue Above  Progress PROM to Goal of full PROM by Week 6.  May add Gentle Mobilizations (GH and Scapulothoracic) to Regain full PROM if Needed.  May add Heat prior to PROM Exercises, Ice after Exercises  May Begin AAROM at Day 29 if ROM is Appropriate in Anticipation of AROM Starting at Week 6   Criteria to Progress to Phase 2    Reasonable Passive Forward Flexion, Abduction ,  IR/ER    Time  Phase 2 (Weeks 6-12)   Protection and Active Motion   Goals of Phase:    Allow Healing of Soft Tissues    Decrease Pain and Inflammation  Add ADLs and Regain AROM by End of Phase   Precautions:    NO STRENGTHENING until Phase 3    Repair is Most Prone to Failure during this Phase!    No Lifting Objects > 2 lbs (Coffee Cup OK), no Sudden Motions    Avoid Upper Extremity Bike and Ergometer   Day 43-56    Discontinue Sling  Initiate AROM Exercises (forward flexion, ER, IR and abduction), Rotator Cuff Isometrics    Continue Periscapular Exercises, add Stretching if PROM Lacking   No Strengthening until Week 12 (Minimum Time Needed for Cuff Healing Sufficient to withstand Strengthening)     Phase 3 (Weeks 12-16)   Early Strengthening   Goal of Phase:    Gain full AROM, Maintain PROM    Gradual return of Shoulder Strength, Power and Endurance    Gradual return to Functional Activities   Precautions:    No Lifting > 10lbs, Sudden Lifting or Pushing activities, Overhead Lifting    No Upper Extremity Bike or Ergometer   Week 12    Initiate Strengthening Program (10 lb Maximum until Phase 4)      ER/IR with Bands (Standing)      ER in Lateral Decubitius Position      Lateral Raises      Full Can in Scapular Plane      Prone Rowing, Horizontal Abduction, Extension      Elbow Flexion/Extension   Week 14-16    Initiate light Functional Activities as Permitted  Progress to Fundamental Shoulder Exercises  Phase 4 (Variable but Weeks 16-24)   Aggressive Rehab  Sport Specific or Activity Specific    Goals of Phase:    Maintain Full Pain-free AROM    Advanced Conditioning Exercises for enhanced Functional use    Continue regaining Shoulder Strength, Power and Endurance    Eventual return to full Functional Activities   Precautions:    None   Week 16    Continue ROM and stretching if appropriate    Progress Strengthening, Proprioceptive and Neuromuscular Training    Light Sports if Progressing Well (Chipping/Putting,  easy ground strokes etc.)   Week 20    Continue Strengthening and Stretching    Initiate Interval Sports Program as Appropriate    Note:   This is a general program which may be modified based on intra-operative findings, additional procedures preformed, repair stability, and patient biological factors.  If in doubt, please check with my office for individual patient specifics.  The ultimate goal of the surgery and rehabilitation is to get the rotator cuff to heal with the least residual functional deficit due to stiffness.  That being said, I would rather have a stiff shoulder with a healed rotator cuff repair, than a loose shoulder with a failed repair!

## 2024-04-22 NOTE — PROGRESS NOTES
"Orthopaedic Surgery - Office Note  Ash Da Silva (43 y.o. male)   : 1980   MRN: [id number]  Encounter Date: 2024    Chief Complaint   Patient presents with    Left Shoulder - Post-op         Assessment/Plan  Diagnoses and all orders for this visit:    S/P shoulder surgery left:   Arthroscopy Left Shoulder with Rotator Cuff Repair, Long Head Biceps Tenodesis, and Subacromial Decompression 24    Patient will follow all postoperative instructions.  Patient will start physical therapy and begin the standard rotator cuff protocol.  Patient will ice the shoulder for 20 minutes on 1 hour off 3 times a day.  Intraoperative pictures were reviewed with the patient and provided in the office today.  Patient will maintain the sling until 6 weeks postoperatively.     Return for Recheck in 8 weeks with Dr. Wells or team.        History of Present Illness  Patient is here for postop visit after left shoulder arthroscopic rotator cuff repair, long head biceps tenodesis, and subacromial decompression on 2024 with Dr. Wells.  Patient's pain is under control.  No postoperative complications are reported.    Review of Systems  Pertinent items are noted in HPI.  All other systems were reviewed and are negative.    Physical Exam  /67 (BP Location: Right arm, Patient Position: Sitting, Cuff Size: Standard)   Pulse 64   Ht 5' 8\" (1.727 m)   Wt 79.8 kg (176 lb)   BMI 26.76 kg/m²   Cons: Appears well.  No apparent distress.  Psych: Alert. Oriented x3.  Mood and affect normal.  Left shoulder incisions are healing well there are no signs of infection.  Patient is neurovascularly intact in the left upper extremity.  Patient has full range of motion of all digits with normal wrist extension.  Sling is fitting appropriately.        Studies Reviewed  Operative report was reviewed for today's visit.    Procedures  No procedures today.    Medical, Surgical, Family, and Social History  The patient's medical " history, family history, and social history, were reviewed and updated as appropriate.    Past Medical History:   Diagnosis Date    Hyperlipidemia        Past Surgical History:   Procedure Laterality Date    COLONOSCOPY      DENTAL SURGERY      [place] SURGICAL ARTHROSCOPY SHOULDER W/ROTATOR CUFF RPR Left 4/17/2024    Procedure: SHOULDER ARTHROSCOPIC ROTATOR CUFF REPAIR, BICEPS REPAIR, SAD;  Surgeon: Pratik Wells MD;  [facility];  Service: Orthopedics    TONSILLECTOMY         Family History   Problem Relation Age of Onset    Hyperlipidemia Mother     Heart disease Father     COPD Father     Hypertension Neg Hx        Social History     Occupational History    Not on file   Tobacco Use    Smoking status: Never    Smokeless tobacco: Never   Vaping Use    Vaping status: Never Used   Substance and Sexual Activity    Alcohol use: Yes     Comment: social    Drug use: No    Sexual activity: Not on file       No Known Allergies      Current Outpatient Medications:     famotidine (PEPCID) 20 mg tablet, Take 20 mg by mouth daily prn, Disp: , Rfl:     oxyCODONE (ROXICODONE) 5 immediate release tablet, 1 tablets every 6 hours as needed for severe shoulder pain only., Disp: 13 tablet, Rfl: 0    pantoprazole (PROTONIX) 40 mg tablet, Take 1 tablet (40 mg total) by mouth daily, Disp: 30 tablet, Rfl: 3    rosuvastatin (CRESTOR) 10 MG tablet, Take 1 tablet (10 mg total) by mouth daily, Disp: 90 tablet, Rfl: 3      Jayden Haro PA-C

## 2024-04-23 ENCOUNTER — EVALUATION (OUTPATIENT)
Dept: PHYSICAL THERAPY | Facility: OTHER | Age: 44
End: 2024-04-23
Payer: COMMERCIAL

## 2024-04-23 DIAGNOSIS — M25.512 LEFT SHOULDER PAIN, UNSPECIFIED CHRONICITY: Primary | ICD-10-CM

## 2024-04-23 DIAGNOSIS — M75.102 TEAR OF LEFT SUPRASPINATUS TENDON: ICD-10-CM

## 2024-04-23 PROCEDURE — 97161 PT EVAL LOW COMPLEX 20 MIN: CPT

## 2024-04-23 PROCEDURE — 97110 THERAPEUTIC EXERCISES: CPT

## 2024-04-23 NOTE — PROGRESS NOTES
PT Evaluation     Today's date: 2024  Patient name: Ash Da Silva  : 1980  MRN: [id number]  Referring provider: Alejandra Pace P*  Dx:   Encounter Diagnosis     ICD-10-CM    1. Left shoulder pain, unspecified chronicity  M25.512       2. Tear of left supraspinatus tendon  M75.102 Ambulatory Referral to Physical Therapy          Start Time: 0830  Stop Time: 915  Total time in clinic (min): 45 minutes    Assessment  Assessment details: Problem List:  1) Left shoulder hypomobility secondary to surgery  2) Left shoulder neuromotor/force deficits secondary to surgery    Ash Da Silva is a pleasant 43 y.o. male who presents s/p SHOULDER ARTHROSCOPIC ROTATOR CUFF (SUPRASPINATUS) REPAIR, BICEPS REPAIR, SAD (Left: Shoulder) performed on 24 by Dr. Wells. He presents with left shoulder hypomobility and neuromotor/force deficits secondary to surgery, nociceptive pain, and left RTC repair resulting in the pain he is experiencing and future ill health (and wanting to prevent it). No further referral appears necessary at this time based upon examination results. I expect he will improve over 4-6 months as expected following RTC repair.  Positive prognostic indicators include positive attitude toward recovery, good understanding of diagnosis and treatment plan options, acuity of symptoms, absence of peripheralization, and absence of observed red flags. Negative prognostic indicators include multiple concurrent orthopedic problems. Patient was provided with a customized home exercise program to begin performing on their own. All patient questions and concerns have been addressed at this time. Thank you for the kind referral.    Comparable signs:  1) Left shoulder flexion PROM < 90 deg  2) Unable to reach overhead with L UE  Impairments: abnormal coordination, abnormal muscle firing, abnormal muscle tone, abnormal or restricted ROM, abnormal movement, activity intolerance, impaired physical strength, lacks  appropriate home exercise program, pain with function, weight-bearing intolerance and poor posture     Symptom irritability: lowUnderstanding of Dx/Px/POC: good   Prognosis: good    Goals  Short Term Goals: to be achieved by 4 weeks  1) Patient to be independent with basic HEP.  2) Decrease pain to 2/10 at its worst.  3) Increase shoulder flexion PROM to 90 deg.  4) Increase shoulder abduction PROM to 60 deg.  5) Increase shoulder ER PROM at 30 deg abduction to 30 deg.  6) Increase shoulder IR PROM at 30 deg abduction to 45 deg.  7) Patient to report decreased sleep interruption secondary to pain.    Long Term Goals: to be achieved by discharge  1) FOTO equal to or greater than goal of 77.  2) Patient to be independent with comprehensive HEP.  3) Improve pain to 1/10 or less with activity for improved quality of life.  4) Increase shoulder ROM to within 5 deg of contralateral UE to improve a/iadls.   5) Increase shoulder strength to 4+/5 MMT grade in all planes to improve a/iadls.  6) Patient to report no sleep interruption secondary to pain.  7) Patient to return to full work/recreational activity with minimal to no limitation.      Plan  Plan details: Prognosis above is given PT services 1-2x/week over the next 4-6 months from DOS and home program adherence.  Patient would benefit from: PT eval and skilled physical therapy  Planned modality interventions: cryotherapy, TENS, thermotherapy: hydrocollator packs and low level laser therapy  Planned therapy interventions: manual therapy, massage, joint mobilization, coordination, graded exercise, graded activity, functional ROM exercises, therapeutic exercise, therapeutic activities, patient education, neuromuscular re-education, home exercise program, flexibility and strengthening  Frequency: 1-2x/week.  Duration in visits: 20  Duration in weeks: 20  Plan of Care beginning date: 4/23/2024  Plan of Care expiration date: 9/10/2024  Treatment plan discussed with:  "patient        Subjective Evaluation    History of Present Illness  Date of surgery: 2024  Mechanism of injury: surgery  Mechanism of injury: Patient is a 43 y.o. male presenting to physical therapy with chief complaint of left shoulder pain following RTC repair. Patient reports his pain is well managed with OTC medications as needed. Patient denies any numbness/tingling other than occasional \"median nerve\" compression which alleviates with wrist positional change. Patient reports positive outlook and understood timeline of recovery.   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 100   Extn. 100   SB Left 100   SB Right 100   ROT Left 100   ROT Right 100         MMT         AROM          PROM    Shoulder       L       R        L           R      L     R   Flex. NT 5 NT WNL 30    Abd. NT 5 NT WNL 20    IR. NT 5 NT WNL 30    ER. NT 5 NT WNL 5             Rhomboid         Mid Trap NT        Low Trap NT        Serratus         Infraspnatus         Teres Major         Sub Scap               Segmental mobility: GHJ: L Hypomobile       Precautions: Standard RTC Repair Protocol Dr. Wells DOS 23. " SHOULDER ARTHROSCOPIC ROTATOR CUFF (SUPRASPINATUS) REPAIR, BICEPS REPAIR, SAD (Left: Shoulder) performed on 4/17/24 by Dr. Wells.    Access Code: [email or web address]    POC expires Unit limit Auth Expiration date PT/OT + Visit Limit?   9/10/24 BOMN N/A BOMN                             Visit 1 IE        Manuals 4/23/24        Shoulder PROM         Soft Tissue Deformation         Scapular Mobs                  Assessment         Neuro Re-Ed         Scap retraction 10x                                                                                                           Ther Ex         Pendulum HEP        Elbow flexion PROM self 2x10 HEP        Cervical AROM 10x HEP        Wrist AROM 10x HEP         10x HEP                                   Ther Activity                           Gait Training                           Modalities

## 2024-04-29 ENCOUNTER — OFFICE VISIT (OUTPATIENT)
Dept: PHYSICAL THERAPY | Facility: OTHER | Age: 44
End: 2024-04-29
Payer: COMMERCIAL

## 2024-04-29 DIAGNOSIS — M25.512 LEFT SHOULDER PAIN, UNSPECIFIED CHRONICITY: ICD-10-CM

## 2024-04-29 DIAGNOSIS — M75.102 TEAR OF LEFT SUPRASPINATUS TENDON: Primary | ICD-10-CM

## 2024-04-29 PROCEDURE — 97140 MANUAL THERAPY 1/> REGIONS: CPT

## 2024-04-29 PROCEDURE — 97110 THERAPEUTIC EXERCISES: CPT

## 2024-04-29 NOTE — PROGRESS NOTES
Daily Note     Today's date: 2024  Patient name: Ash Da Silva  : 1980  MRN: [id number]  Referring provider: Alejandra Pace P*  Dx:   Encounter Diagnosis     ICD-10-CM    1. Tear of left supraspinatus tendon  M75.102       2. Left shoulder pain, unspecified chronicity  M25.512           Start Time: 733  Stop Time: 811  Total time in clinic (min): 38 minutes  1 on 1 31 minutes all else IEP     Subjective: Patient reports his shoulder has been doing well with minimal to no pain.       Objective: See treatment diary below      Assessment: Tolerated treatment well. Continuing to progress per protocol. Patient exhibited good technique with therapeutic exercises and would benefit from continued PT.       Plan: Continue per plan of care.      Precautions: Standard RTC Repair Protocol Dr. Wells DOS 23. (1 week 5 days post-op) SHOULDER ARTHROSCOPIC ROTATOR CUFF (SUPRASPINATUS) REPAIR, BICEPS REPAIR, SAD (Left: Shoulder) performed on 24 by Dr. Wells.    Access Code: [email or web address]    POC expires Unit limit Auth Expiration date PT/OT + Visit Limit?   9/10/24 BOMN N/A BOMN                             Visit 1 IE 2       Manuals 24       Shoulder PROM  GJL protocol       Soft Tissue Deformation  GJL surrounding shoulder       Scapular Mobs  GJL                Assessment         Neuro Re-Ed         Scap retraction 10x 30x                                                                                                          Ther Ex         Pendulum HEP 3'       Elbow flexion PROM self 2x10 HEP HEP       Cervical AROM 10x HEP 2x10       Wrist AROM 10x HEP         10x HEP Digi red/green 40x                                  Ther Activity                           Gait Training                           Modalities

## 2024-04-30 ENCOUNTER — DOCUMENTATION (OUTPATIENT)
Dept: FAMILY MEDICINE CLINIC | Facility: CLINIC | Age: 44
End: 2024-04-30

## 2024-04-30 DIAGNOSIS — E78.5 DYSLIPIDEMIA: Primary | ICD-10-CM

## 2024-04-30 RX ORDER — ROSUVASTATIN CALCIUM 10 MG/1
10 TABLET, COATED ORAL DAILY
Qty: 90 TABLET | Refills: 3 | Status: SHIPPED | OUTPATIENT
Start: 2024-04-30

## 2024-05-08 ENCOUNTER — OFFICE VISIT (OUTPATIENT)
Dept: PHYSICAL THERAPY | Facility: OTHER | Age: 44
End: 2024-05-08
Payer: COMMERCIAL

## 2024-05-08 DIAGNOSIS — M25.512 LEFT SHOULDER PAIN, UNSPECIFIED CHRONICITY: ICD-10-CM

## 2024-05-08 DIAGNOSIS — M75.102 TEAR OF LEFT SUPRASPINATUS TENDON: Primary | ICD-10-CM

## 2024-05-08 PROCEDURE — 97140 MANUAL THERAPY 1/> REGIONS: CPT

## 2024-05-08 PROCEDURE — 97110 THERAPEUTIC EXERCISES: CPT

## 2024-05-08 NOTE — PROGRESS NOTES
Daily Note     Today's date: 2024  Patient name: Ash Da Silva  : 1980  MRN: [id number]  Referring provider: Alejandra Pace P*  Dx:   Encounter Diagnosis     ICD-10-CM    1. Tear of left supraspinatus tendon  M75.102       2. Left shoulder pain, unspecified chronicity  M25.512           Start Time: 1535  Stop Time: 1615  Total time in clinic (min): 40 minutes  1 on 1 28 minutes all else IEP     Subjective: Patient reports his shoulder continues to do well with minimal to no pain.       Objective: See treatment diary below      Assessment: Tolerated treatment well. Continuing to progress per protocol. Incorporated self passive shoulder flexion table slide to 90 deg for patient to incorporate into HEP. No adverse reactions to treatment. Patient exhibited good technique with therapeutic exercises and would benefit from continued PT.       Plan: Continue per plan of care.      Precautions: Standard RTC Repair Protocol Dr. Wells DOS 23. (3 week 0 days post-op) SHOULDER ARTHROSCOPIC ROTATOR CUFF (SUPRASPINATUS) REPAIR, BICEPS REPAIR, SAD (Left: Shoulder) performed on 24 by Dr. Wells.    Access Code: [email or web address]    POC expires Unit limit Auth Expiration date PT/OT + Visit Limit?   9/10/24 BOMN N/A BOMN                             Visit 1 IE 2 3      Manuals 24      Shoulder PROM  GJL protocol GJL protocol      Soft Tissue Deformation  GJL surrounding shoulder       Scapular Mobs  GJL GJL                Assessment         Neuro Re-Ed         Scap retraction 10x 30x 2x20                                                                                                         Ther Ex         Pendulum HEP 3' 5'      Elbow flexion PROM self 2x10 HEP HEP       Cervical AROM 10x HEP 2x10 2x10      Wrist AROM 10x HEP         10x HEP Digi red/green 40x Digi  green to black 2x30      Table slide flexion PROM   30x HEP                        Ther Activity                            Gait Training                           Modalities

## 2024-05-13 ENCOUNTER — OFFICE VISIT (OUTPATIENT)
Dept: PHYSICAL THERAPY | Facility: OTHER | Age: 44
End: 2024-05-13
Payer: COMMERCIAL

## 2024-05-13 DIAGNOSIS — M25.512 LEFT SHOULDER PAIN, UNSPECIFIED CHRONICITY: ICD-10-CM

## 2024-05-13 DIAGNOSIS — M75.102 TEAR OF LEFT SUPRASPINATUS TENDON: Primary | ICD-10-CM

## 2024-05-13 PROCEDURE — 97140 MANUAL THERAPY 1/> REGIONS: CPT

## 2024-05-13 PROCEDURE — 97110 THERAPEUTIC EXERCISES: CPT

## 2024-05-13 NOTE — PROGRESS NOTES
Daily Note     Today's date: 2024  Patient name: Ash Da Silva  : 1980  MRN: [id number]  Referring provider: Alejandra Pace P*  Dx:   Encounter Diagnosis     ICD-10-CM    1. Tear of left supraspinatus tendon  M75.102       2. Left shoulder pain, unspecified chronicity  M25.512           Start Time: 805  Stop Time: 845  Total time in clinic (min): 40 minutes  1 on [id number] 23 minutes all else IEP    Subjective: Patient reports no new pain or issues, reports his shoulder is feeling looser.       Objective: See treatment diary below      Assessment: Tolerated treatment well. Continuing to progress per protocol. No adverse reactions to treatment. Patient exhibited good technique with therapeutic exercises and would benefit from continued PT.       Plan: Continue per plan of care.      Precautions: Standard RTC Repair Protocol Dr. Wells DOS 24. (3 weeks 5 days post-op) SHOULDER ARTHROSCOPIC ROTATOR CUFF (SUPRASPINATUS) REPAIR, BICEPS REPAIR, SAD (Left: Shoulder) performed on 24 by Dr. Wells.    Access Code: [email or web address]    POC expires Unit limit Auth Expiration date PT/OT + Visit Limit?   9/10/24 BOMN N/A BOMN                             Visit 1 IE 2 3 4     Manuals 24     Shoulder PROM  GJL protocol GJL protocol GJL protocol      Soft Tissue Deformation  GJL surrounding shoulder       Scapular Mobs  GJL GJL  GJL              Assessment         Neuro Re-Ed         Scap retraction 10x 30x 2x20 2x20                                                                                                        Ther Ex         Pendulum HEP 3' 5' 5'     Elbow flexion PROM self 2x10 HEP HEP  Elbow AROM 3x10     Cervical AROM 10x HEP 2x10 2x10 2x10     Wrist AROM 10x HEP         10x HEP Digi red/green 40x Digi  green to black 2x30 Digi  green to black 2x30     Table slide flexion PROM   30x HEP 30x HEP                       Ther Activity                            Gait Training                           Modalities

## 2024-05-22 ENCOUNTER — OFFICE VISIT (OUTPATIENT)
Dept: PHYSICAL THERAPY | Facility: OTHER | Age: 44
End: 2024-05-22
Payer: COMMERCIAL

## 2024-05-22 DIAGNOSIS — M25.512 LEFT SHOULDER PAIN, UNSPECIFIED CHRONICITY: ICD-10-CM

## 2024-05-22 DIAGNOSIS — M75.102 TEAR OF LEFT SUPRASPINATUS TENDON: Primary | ICD-10-CM

## 2024-05-22 PROCEDURE — 97140 MANUAL THERAPY 1/> REGIONS: CPT

## 2024-05-22 PROCEDURE — 97112 NEUROMUSCULAR REEDUCATION: CPT

## 2024-05-22 PROCEDURE — 97110 THERAPEUTIC EXERCISES: CPT

## 2024-05-22 NOTE — PROGRESS NOTES
Daily Note     Today's date: 2024  Patient name: Ash Da Silva  : 1980  MRN: [id number]  Referring provider: Alejandra Pace P*  Dx:   Encounter Diagnosis     ICD-10-CM    1. Tear of left supraspinatus tendon  M75.102       2. Left shoulder pain, unspecified chronicity  M25.512           Start Time: 1559  Stop Time: 1638  Total time in clinic (min): 39 minutes    Subjective: Patient reports he has been awaking in the night with his arm in positions out of his sling which have been painful, but otherwise is doing well. He reports persistent numbness/paraesthesias of his left hand.       Objective: See treatment diary below    L shoulder PROM  ER: 45 deg  Flexion: 125 deg    Assessment: Tolerated treatment well. Continuing to progress per protocol. Incorporated AAROM exercises this visit in supine with no adverse reactions and good patient response in preparation for Phase II of protocol and AROM next week. Patient exhibited good technique with therapeutic exercises and would benefit from continued PT.       Plan: Continue per plan of care.      Precautions: Standard RTC Repair Protocol Dr. Wells DOS 24. (5 weeks 0 days post-op) SHOULDER ARTHROSCOPIC ROTATOR CUFF (SUPRASPINATUS) REPAIR, BICEPS REPAIR, SAD (Left: Shoulder) performed on 24 by Dr. Wells.    Access Code: [email or web address]    POC expires Unit limit Auth Expiration date PT/OT + Visit Limit?   9/10/24 BOMN N/A BOMN                             Visit 1 IE 2 3 4 5    Manuals 24    Shoulder PROM  GJL protocol GJL protocol GJL protocol  GJL protocol gradual progression to full ROM    Soft Tissue Deformation  GJL surrounding shoulder       Scapular Mobs  GJL GJL  GJL              Assessment         Neuro Re-Ed         Scap retraction 10x 30x 2x20 2x20 Row R UE Orange Tube, scap retract left 2x10    LPD     R UE Orange Tube, scap retract left 2x10                                                                                               Ther Ex         Pulley with scap setting     4'     Pendulum HEP 3' 5' 5' 4'    Elbow flexion PROM self 2x10 HEP HEP  Elbow AROM 3x10 2x10    Cervical AROM 10x HEP 2x10 2x10 2x10     Wrist AROM 10x HEP         10x HEP Digi red/green 40x Digi  green to black 2x30 Digi  green to black 2x30 With pendulums green/blue 30x ea    Table slide flexion PROM   30x HEP 30x HEP HEP    Supine Flexion     AAROM hands clasped 30x    Supine dowel ER     30x                      Ther Activity                           Gait Training                           Modalities

## 2024-05-28 ENCOUNTER — OFFICE VISIT (OUTPATIENT)
Dept: PHYSICAL THERAPY | Facility: OTHER | Age: 44
End: 2024-05-28
Payer: COMMERCIAL

## 2024-05-28 DIAGNOSIS — M25.512 LEFT SHOULDER PAIN, UNSPECIFIED CHRONICITY: ICD-10-CM

## 2024-05-28 DIAGNOSIS — M75.102 TEAR OF LEFT SUPRASPINATUS TENDON: Primary | ICD-10-CM

## 2024-05-28 PROCEDURE — 97110 THERAPEUTIC EXERCISES: CPT

## 2024-05-28 PROCEDURE — 97140 MANUAL THERAPY 1/> REGIONS: CPT

## 2024-05-28 NOTE — PROGRESS NOTES
Daily Note     Today's date: 2024  Patient name: Ash Da Silva  : 1980  MRN: [id number]  Referring provider: Alejandra Pace P*  Dx:   Encounter Diagnosis     ICD-10-CM    1. Tear of left supraspinatus tendon  M75.102       2. Left shoulder pain, unspecified chronicity  M25.512           Start Time: 1220  Stop Time: 1250  Total time in clinic (min): 30 minutes    Subjective: Patient reports some soreness surrounding his left shoulder, but is steadily improving.       Objective: See treatment diary below    L shoulder PROM  ER: 50 deg  Flexion: 130 deg    Assessment: Tolerated treatment well. Continuing to progress per protocol with no adverse reactions. Patient exhibited good technique with therapeutic exercises and would benefit from continued PT.       Plan: Continue per plan of care.      Precautions: Standard RTC Repair Protocol Dr. Wells DOS 24. (5 weeks 6 days post-op) SHOULDER ARTHROSCOPIC ROTATOR CUFF (SUPRASPINATUS) REPAIR, BICEPS REPAIR, SAD (Left: Shoulder) performed on 24 by Dr. Wells.    Access Code: [email or web address]    POC expires Unit limit Auth Expiration date PT/OT + Visit Limit?   9/10/24 BOMN N/A BOMN                             Visit 1 IE 2 3 4 5 6   Manuals 24   Shoulder PROM  GJL protocol GJL protocol GJL protocol  GJL protocol gradual progression to full ROM GJL protocol gradual progression to full ROM   Soft Tissue Deformation  GJL surrounding shoulder       Scapular Mobs  GJL GJL  GJL              Assessment         Neuro Re-Ed         Scap retraction 10x 30x 2x20 2x20 Row R UE Orange Tube, scap retract left 2x10 30x   LPD     R UE Orange Tube, scap retract left 2x10    Wall scap setting      2x10                                                                                    Ther Ex         [place] with scap setting     4'  4'   Pendulum HEP 3' 5' 5' 4' 4'   Elbow flexion PROM self 2x10 HEP HEP  Elbow AROM 3x10 2x10 3x10    Cervical AROM 10x HEP 2x10 2x10 2x10     Wrist AROM 10x HEP         10x HEP Digi red/green 40x Digi  green to black 2x30 Digi  green to black 2x30 With pendulums green/blue 30x ea With pendulums blue 30x ea   Table slide flexion PROM   30x HEP 30x HEP HEP HEP   Wall slide flexion      2x10   Supine Flexion     AAROM hands clasped 30x Cane AAROM 2x10 to 90   Supine dowel ER     30x                      Ther Activity                           Gait Training                           Modalities

## 2024-06-05 ENCOUNTER — OFFICE VISIT (OUTPATIENT)
Dept: PHYSICAL THERAPY | Facility: OTHER | Age: 44
End: 2024-06-05
Payer: COMMERCIAL

## 2024-06-05 DIAGNOSIS — M25.512 LEFT SHOULDER PAIN, UNSPECIFIED CHRONICITY: ICD-10-CM

## 2024-06-05 DIAGNOSIS — M75.102 TEAR OF LEFT SUPRASPINATUS TENDON: Primary | ICD-10-CM

## 2024-06-05 PROCEDURE — 97112 NEUROMUSCULAR REEDUCATION: CPT

## 2024-06-05 PROCEDURE — 97140 MANUAL THERAPY 1/> REGIONS: CPT

## 2024-06-05 NOTE — PROGRESS NOTES
"Daily Note     Today's date: 2024  Patient name: Ash Da Silva  : 1980  MRN: [id number]  Referring provider: Alejandra Pace P*  Dx:   Encounter Diagnosis     ICD-10-CM    1. Tear of left supraspinatus tendon  M75.102       2. Left shoulder pain, unspecified chronicity  M25.512                    Total treatment time 6776-6583, 1-on- 4440-9031  Subjective: \"I am feeling good, except I still have pain when sleeping.\"      Objective: See treatment diary below      Assessment: Session focused on HEP review of exercises, tolerated all treatment well. Patient tolerated about 50 degrees ER and about 110 degrees flexion. Added flexion iso and scap setting with AROM flexion to HEP today. Pt reported soreness after iso, encouraged to perform at 50% full contraction at home. Continue to progress per protocol as tolerated.       Plan: Continue per plan of care.      Precautions: Standard RTC Repair Protocol Dr. Wells DOS 24. (5 weeks 6 days post-op) SHOULDER ARTHROSCOPIC ROTATOR CUFF (SUPRASPINATUS) REPAIR, BICEPS REPAIR, SAD (Left: Shoulder) performed on 24 by Dr. Wells.    Access Code: [email or web address]    POC expires Unit limit Auth Expiration date PT/OT + Visit Limit?   9/10/24 BOMN N/A BOMN                             Visit 1 IE 2 3 4 5 6 7   Manuals 24   Shoulder PROM  GJL protocol GJL protocol GJL protocol  GJL protocol gradual progression to full ROM GJL protocol gradual progression to full ROM KA protocol gradual progression to full ROM   Soft Tissue Deformation  GJL surrounding shoulder        Scapular Mobs  GJL GJL  GJL                Assessment          Neuro Re-Ed          Scap retraction 10x 30x 2x20 2x20 Row R UE Orange Tube, scap retract left 2x10 30x 30x   LPD     R UE Orange Tube, scap retract left 2x10     Wall scap setting      2x10 2x10   Scap set with flexion       2x10 to 90 degrees, added to HEP   Shoulder iso       Flex 5\"x15, " 50% contraction, added to HEP                                                                         Ther Ex          [place] with scap setting     4'  4' 4'   Pendulum HEP 3' 5' 5' 4' 4' 4'   Elbow flexion PROM self 2x10 HEP HEP  Elbow AROM 3x10 2x10 3x10 3x10   Cervical AROM 10x HEP 2x10 2x10 2x10      Wrist AROM 10x HEP      20x    10x HEP Digi red/green 40x Digi  green to black 2x30 Digi  green to black 2x30 With pendulums green/blue 30x ea With pendulums blue 30x ea With pendulums blue 30x ea   Table slide flexion PROM   30x HEP 30x HEP HEP HEP    Wall slide flexion      2x10 2x10   Supine Flexion     AAROM hands clasped 30x Cane AAROM 2x10 to 90 Cane AAROM 2x10 to 90   Supine dowel ER     30x                         Ther Activity                              Gait Training                              Modalities

## 2024-06-11 ENCOUNTER — OFFICE VISIT (OUTPATIENT)
Dept: PHYSICAL THERAPY | Facility: OTHER | Age: 44
End: 2024-06-11
Payer: COMMERCIAL

## 2024-06-11 DIAGNOSIS — M25.512 LEFT SHOULDER PAIN, UNSPECIFIED CHRONICITY: ICD-10-CM

## 2024-06-11 DIAGNOSIS — M75.102 TEAR OF LEFT SUPRASPINATUS TENDON: Primary | ICD-10-CM

## 2024-06-11 PROCEDURE — 97140 MANUAL THERAPY 1/> REGIONS: CPT

## 2024-06-11 NOTE — PROGRESS NOTES
Daily Note     Today's date: 2024  Patient name: Ash Da Silva  : 1980  MRN: [id number]  Referring provider: Alejandra Pace P*  Dx:   Encounter Diagnosis     ICD-10-CM    1. Tear of left supraspinatus tendon  M75.102       2. Left shoulder pain, unspecified chronicity  M25.512           Start Time: 1530  Stop Time: 1600  Total time in clinic (min): 30 minutes  1 on 1 15 minutes all else IEP    Subjective: Patient reports feeling as though his motion continues to improve as well as not having as much pain/symptoms as before.      Objective: See treatment diary below      Assessment: Continued focus on gradual ROM and introduced patient to multi-plane RTC/shoulder isometrics with mild discomfort abduction - educated to remain submaximal within tolerance. Will continue to progress per tolerance/protocol.       Plan: Continue per plan of care.      Precautions: Standard RTC Repair Protocol Dr. Wells DOS 24. (7 weeks 6 days post-op) SHOULDER ARTHROSCOPIC ROTATOR CUFF (SUPRASPINATUS) REPAIR, BICEPS REPAIR, SAD (Left: Shoulder) performed on 24 by Dr. Wells.    Access Code: [email or web address]    POC expires Unit limit Auth Expiration date PT/OT + Visit Limit?   9/10/24 BOMN N/A BOMN                             Visit 1 IE 2 3 4 5 6 7 8   Manuals 24   Shoulder PROM  GJL protocol GJL protocol GJL protocol  GJL protocol gradual progression to full ROM GJL protocol gradual progression to full ROM KA protocol gradual progression to full ROM GJL protocol gradual progression full ROM   Soft Tissue Deformation  GJL surrounding shoulder         Scapular Mobs  GJL GJL  GJL                  Assessment           Neuro Re-Ed           Scap retraction 10x 30x 2x20 2x20 Row R UE Orange Tube, scap retract left 2x10 30x 30x 30x   LPD     R UE Orange Tube, scap retract left 2x10      Wall scap setting      2x10 2x10    Scap set with flexion       2x10 to 90  "degrees, added to HEP 3x10   Shoulder iso       Flex 5\"x15, 50% contraction, added to HEP Flex/Ext/Abd/ER/IR 15x5-10\" HEP                                                                                Ther Ex           [place] with scap setting     4'  4' 4'    Pendulum HEP 3' 5' 5' 4' 4' 4' DC   Elbow flexion PROM self 2x10 HEP HEP  Elbow AROM 3x10 2x10 3x10 3x10    Cervical AROM 10x HEP 2x10 2x10 2x10       Wrist AROM 10x HEP      20x     10x HEP Digi red/green 40x Digi  green to black 2x30 Digi  green to black 2x30 With pendulums green/blue 30x ea With pendulums blue 30x ea With pendulums blue 30x ea    Table slide flexion PROM   30x HEP 30x HEP HEP HEP     Wall slide flexion      2x10 2x10 3x10   Wall walk flexion stretch        10x10\"    Pball flexion        10x10\"   Supine Flexion     AAROM hands clasped 30x Cane AAROM 2x10 to 90 Cane AAROM 2x10 to 90    Supine dowel ER     30x   10x10\"                         Ther Activity                                 Gait Training                                 Modalities                                             "

## 2024-06-27 ENCOUNTER — OFFICE VISIT (OUTPATIENT)
Dept: OBGYN CLINIC | Facility: OTHER | Age: 44
End: 2024-06-27

## 2024-06-27 VITALS
HEART RATE: 56 BPM | WEIGHT: 180 LBS | HEIGHT: 68 IN | SYSTOLIC BLOOD PRESSURE: 125 MMHG | DIASTOLIC BLOOD PRESSURE: 78 MMHG | BODY MASS INDEX: 27.28 KG/M2

## 2024-06-27 DIAGNOSIS — M75.102 TEAR OF LEFT SUPRASPINATUS TENDON: Primary | ICD-10-CM

## 2024-06-27 PROCEDURE — 99024 POSTOP FOLLOW-UP VISIT: CPT | Performed by: ORTHOPAEDIC SURGERY

## 2024-06-27 NOTE — PROGRESS NOTES
Assesment:    Doing well status post left shoulder RCR, BT 4/17/24    Plan:    Patient is doing great and has worked through his initial issues in the recovery.,  Demonstrates excellent range of motion with minimal pain.  He will continue along the rehab protocol and be discharged to home exercise program once him and the therapist feel appropriate.  He does have some complaints of left elbow olecranon bursitis which peers to be resolving, we recommended possibly the use of an elbow pad to avoid direct contact with the olecranon and if the symptoms persist further evaluation could be considered but treatment options are limited for olecranon bursitis especially when it is resolving.  Patient understands and is happy with the progress of his left shoulder.      HPI:    Patient returns for follow-up of his left shoulder status post rotator cuff repair and biceps tenodesis April 17, 2024.  He reports making excellent progress in his therapy notes have been reviewed for this visit and are consistent with this excellent progress.  Does have some complaint of left elbow olecranon bursitis which is resolving.      PE:    Left shoulder full active forward elevation and external rotation  Good resistance to abduction testing    Left elbow mild crepitation about the olecranon bursa without erythema or significant fluid collection

## 2024-07-01 ENCOUNTER — OFFICE VISIT (OUTPATIENT)
Dept: PHYSICAL THERAPY | Facility: OTHER | Age: 44
End: 2024-07-01
Payer: COMMERCIAL

## 2024-07-01 DIAGNOSIS — M75.102 TEAR OF LEFT SUPRASPINATUS TENDON: Primary | ICD-10-CM

## 2024-07-01 DIAGNOSIS — M25.512 LEFT SHOULDER PAIN, UNSPECIFIED CHRONICITY: ICD-10-CM

## 2024-07-01 PROCEDURE — 97140 MANUAL THERAPY 1/> REGIONS: CPT

## 2024-07-01 PROCEDURE — 97112 NEUROMUSCULAR REEDUCATION: CPT

## 2024-07-01 PROCEDURE — 97110 THERAPEUTIC EXERCISES: CPT

## 2024-07-01 NOTE — PROGRESS NOTES
"Daily Note     Today's date: 2024  Patient name: Ash Da Silva  : 1980  MRN: [id number]  Referring provider: Alejandra Pace P*  Dx:   Encounter Diagnosis     ICD-10-CM    1. Tear of left supraspinatus tendon  M75.102       2. Left shoulder pain, unspecified chronicity  M25.512           Start Time: 0700  Stop Time: 0739  Total time in clinic (min): 39 minutes    Subjective: Patient reports doing well at this stage of rehab with only minor stiffness with reaching across his body and behind his back. Patient reports some elbow discomfort that is gradually improving.     Objective: See treatment diary below      Assessment: Continued focus on mobility which is normalizing nicely all planes - will initiate introductory strengthening exercises at 12 weeks according to protocol. Will continue to progress per tolerance/protocol.       Plan: Continue per plan of care.      Precautions: Standard RTC Repair Protocol Dr. Wells DOS 24. (10 weeks 5 days post-op) SHOULDER ARTHROSCOPIC ROTATOR CUFF (SUPRASPINATUS) REPAIR, BICEPS REPAIR, SAD (Left: Shoulder) performed on 24 by Dr. Wells.    Access Code: [email or web address]    POC expires Unit limit Auth Expiration date PT/OT + Visit Limit?   9/10/24 BOMN N/A BOMN                             Visit 6 7 8 9    Manuals 24    Shoulder PROM GJL protocol gradual progression to full ROM KA protocol gradual progression to full ROM GJL protocol gradual progression full ROM GJL protocol gradual progression full ROM    Soft Tissue Deformation        Scapular Mobs                Assessment        Neuro Re-Ed        Scap retraction 30x 30x 30x 30x prone row    LPD        Wall scap setting 2x10 2x10  3x10    Scap set with flexion  2x10 to 90 degrees, added to HEP 3x10 30x    Shoulder iso  Flex 5\"x15, 50% contraction, added to HEP Flex/Ext/Abd/ER/IR 15x5-10\" HEP Flex/Ext/Abd/ER/IR 15x5-10\" HEP                                                          " "  Ther Ex        Pulley with scap setting 4' 4'      Pendulum 4' 4' DC     Elbow flexion PROM self 3x10 3x10      Cervical AROM        Wrist AROM  20x       With pendulums blue 30x ea With pendulums blue 30x ea      Table slide flexion PROM HEP       Wall slide flexion 2x10 2x10 3x10     Wall walk flexion stretch   10x10\"      Pball flexion   10x10\"     Supine Flexion Cane AAROM 2x10 to 90 Cane AAROM 2x10 to 90      Supine dowel ER   10x10\"     IR stretch strap    10x10\"    Cross body stretch    10x10\"                    Ther Activity                        Gait Training                        Modalities                                    "

## 2024-07-10 ENCOUNTER — TELEPHONE (OUTPATIENT)
Dept: GASTROENTEROLOGY | Facility: AMBULARY SURGERY CENTER | Age: 44
End: 2024-07-10

## 2024-07-12 DIAGNOSIS — K21.9 GASTROESOPHAGEAL REFLUX DISEASE WITHOUT ESOPHAGITIS: ICD-10-CM

## 2024-07-12 RX ORDER — PANTOPRAZOLE SODIUM 40 MG/1
40 TABLET, DELAYED RELEASE ORAL DAILY
Qty: 30 TABLET | Refills: 5 | Status: SHIPPED | OUTPATIENT
Start: 2024-07-12

## 2024-07-18 ENCOUNTER — APPOINTMENT (OUTPATIENT)
Dept: PHYSICAL THERAPY | Facility: OTHER | Age: 44
End: 2024-07-18
Payer: COMMERCIAL

## 2024-07-19 ENCOUNTER — OFFICE VISIT (OUTPATIENT)
Dept: PHYSICAL THERAPY | Facility: OTHER | Age: 44
End: 2024-07-19
Payer: COMMERCIAL

## 2024-07-19 ENCOUNTER — TELEPHONE (OUTPATIENT)
Age: 44
End: 2024-07-19

## 2024-07-19 DIAGNOSIS — M75.102 TEAR OF LEFT SUPRASPINATUS TENDON: Primary | ICD-10-CM

## 2024-07-19 DIAGNOSIS — M25.512 LEFT SHOULDER PAIN, UNSPECIFIED CHRONICITY: ICD-10-CM

## 2024-07-19 PROCEDURE — 97110 THERAPEUTIC EXERCISES: CPT

## 2024-07-19 PROCEDURE — 97112 NEUROMUSCULAR REEDUCATION: CPT

## 2024-07-19 NOTE — TELEPHONE ENCOUNTER
Scheduled date of EGD(as of today):10/18/2024  Physician performing EGD:Dr Mcknight  Location of EGD:[facility]  Instructions reviewed with patient by:  Clearances:

## 2024-07-19 NOTE — PROGRESS NOTES
Daily Note     Today's date: 2024  Patient name: Ash Da Silva  : 1980  MRN: [id number]  Referring provider: Alejandra Pace P*  Dx:   Encounter Diagnosis     ICD-10-CM    1. Tear of left supraspinatus tendon  M75.102       2. Left shoulder pain, unspecified chronicity  M25.512           Start Time: 0900  Stop Time: 0930  Total time in clinic (min): 30 minutes    Subjective: Patient reports doing very well at this time and is eager to get back to higher level activities and fast paced swimming events.       Objective: See treatment diary below    L Shoulder AROM Normalizing all planes    Assessment: Patient Phase III of protocol at this time - introduced patient to several strrengthening exercises within protocol limits. Educated patient on current precaution of this phase which include: no lifting > 10lbs, no sudden lifting/pushing, and no overhead lifting or use of UBE. Patient verbalized understanding. No adverse reactions to treatment. Patient demonstrated fatigue post-treatment.       Plan: Continue per plan of care.      Precautions: Standard RTC Repair Protocol Dr. Wells DOS 24. (13 weeks 2 days post-op) SHOULDER ARTHROSCOPIC ROTATOR CUFF (SUPRASPINATUS) REPAIR, BICEPS REPAIR, SAD (Left: Shoulder) performed on 24 by Dr. Wells.    Access Code: [email or web address]    POC expires Unit limit Auth Expiration date PT/OT + Visit Limit?   9/10/24 BOMN N/A BOMN                             Visit 6 7 8 9 10   Manuals 24   Shoulder PROM GJL protocol gradual progression to full ROM KA protocol gradual progression to full ROM GJL protocol gradual progression full ROM GJL protocol gradual progression full ROM    Soft Tissue Deformation        Scapular Mobs                Assessment     GJL   Neuro Re-Ed        Scap retraction 30x 30x 30x 30x prone row Orange Tube 30x HEP   LPD     [place] Tube 30x HEP   IR     [place] Tube 30x HEP   ER     [place] Tube 30x HEP   Pball T    "  20x HEP   Wall scap setting 2x10 2x10  3x10    Scap set with flexion  2x10 to 90 degrees, added to HEP 3x10 30x    Shoulder iso  Flex 5\"x15, 50% contraction, added to HEP Flex/Ext/Abd/ER/IR 15x5-10\" HEP Flex/Ext/Abd/ER/IR 15x5-10\" HEP                                                            Ther Ex        Elbow Flexion     5# 20x HEP   Triceps Ext     [place] 12# 30x HEP   Wall Push-Up     2x10 HEP   Pulley with scap setting 4' 4'      Pendulum 4' 4' DC     Elbow flexion PROM self 3x10 3x10      Cervical AROM        Wrist AROM  20x       With pendulums blue 30x ea With pendulums blue 30x ea      Table slide flexion PROM HEP       Wall slide flexion 2x10 2x10 3x10     Wall walk flexion stretch   10x10\"      Pball flexion   10x10\"     Supine Flexion Cane AAROM 2x10 to 90 Cane AAROM 2x10 to 90      Supine dowel ER   10x10\"     IR stretch strap    10x10\" HEP   Cross body stretch    10x10\" HEP                   Ther Activity                        Gait Training                        Modalities                                    "

## 2024-09-10 ENCOUNTER — OFFICE VISIT (OUTPATIENT)
Dept: PHYSICAL THERAPY | Facility: OTHER | Age: 44
End: 2024-09-10
Payer: COMMERCIAL

## 2024-09-10 DIAGNOSIS — M75.102 TEAR OF LEFT SUPRASPINATUS TENDON: ICD-10-CM

## 2024-09-10 DIAGNOSIS — M25.571 CHRONIC PAIN OF RIGHT ANKLE: Primary | ICD-10-CM

## 2024-09-10 DIAGNOSIS — M25.512 LEFT SHOULDER PAIN, UNSPECIFIED CHRONICITY: ICD-10-CM

## 2024-09-10 DIAGNOSIS — G89.29 CHRONIC PAIN OF RIGHT ANKLE: Primary | ICD-10-CM

## 2024-09-10 PROCEDURE — 97110 THERAPEUTIC EXERCISES: CPT

## 2024-09-10 PROCEDURE — 97140 MANUAL THERAPY 1/> REGIONS: CPT

## 2024-09-10 NOTE — PROGRESS NOTES
Daily Note     Today's date: 9/10/2024  Patient name: Ash Da Silva  : 1980  MRN: [id number]  Referring provider: lAejandra Pace P*  Dx:   Encounter Diagnosis     ICD-10-CM    1. Tear of left supraspinatus tendon  M75.102       2. Left shoulder pain, unspecified chronicity  M25.512                        Subjective:   LV: Patient reports doing very well at this time and is eager to get back to higher level activities and fast paced swimming events.     Objective: See treatment diary below  LV:   L Shoulder AROM Normalizing all planes    Assessment:   LV: Patient Phase III of protocol at this time - introduced patient to several strrengthening exercises within protocol limits. Educated patient on current precaution of this phase which include: no lifting > 10lbs, no sudden lifting/pushing, and no overhead lifting or use of UBE. Patient verbalized understanding. No adverse reactions to treatment. Patient demonstrated fatigue post-treatment.       Plan: Continue per plan of care.      Precautions: Standard RTC Repair Protocol Dr. Wells DOS 24. (20 weeks 6 days post-op) SHOULDER ARTHROSCOPIC ROTATOR CUFF (SUPRASPINATUS) REPAIR, BICEPS REPAIR, SAD (Left: Shoulder) performed on 24 by Dr. Wells.    Access Code: [email or web address]    POC expires Unit limit Auth Expiration date PT/OT + Visit Limit?   9/10/24 BOMN N/A BOMN                             Visit 6 7 8 9 10 11   Manuals 5/28/24 6/5/24 6/11/24 7/1/24 7/19/24 9/10/24   Shoulder PROM GJL protocol gradual progression to full ROM KA protocol gradual progression to full ROM GJL protocol gradual progression full ROM GJL protocol gradual progression full ROM     Soft Tissue Deformation         Scapular Mobs                  Assessment     GJL    Neuro Re-Ed         Scap retraction 30x 30x 30x 30x prone row Orange Tube 30x HEP    LPD     [place] Tube 30x HEP    IR     [place] Tube 30x HEP    ER     [place] Tube 30x HEP    Pball T     20x HEP    Wall scap  "setting 2x10 2x10  3x10     Scap set with flexion  2x10 to 90 degrees, added to HEP 3x10 30x     Shoulder iso  Flex 5\"x15, 50% contraction, added to HEP Flex/Ext/Abd/ER/IR 15x5-10\" HEP Flex/Ext/Abd/ER/IR 15x5-10\" HEP                                                                    Ther Ex         Elbow Flexion     5# 20x HEP    Triceps Ext     [place] 12# 30x HEP    Wall Push-Up     2x10 HEP    Pulley with scap setting 4' 4'       Pendulum 4' 4' DC      Elbow flexion PROM self 3x10 3x10       Cervical AROM         Wrist AROM  20x        With pendulums blue 30x ea With pendulums blue 30x ea       Table slide flexion PROM HEP        Wall slide flexion 2x10 2x10 3x10      Wall walk flexion stretch   10x10\"       Pball flexion   10x10\"      Supine Flexion Cane AAROM 2x10 to 90 Cane AAROM 2x10 to 90       Supine dowel ER   10x10\"      IR stretch strap    10x10\" HEP    Cross body stretch    10x10\" HEP                      Ther Activity                           Gait Training                           Modalities                                       "

## 2024-09-10 NOTE — PROGRESS NOTES
PT Evaluation R Ankle + L Shoulder Continued Treatment    Today's date: 9/10/2024  Patient name: Ash Da Silva  : 1980  MRN: [id number]  Referring provider: Alejandra Pace P*  Dx:   Encounter Diagnosis     ICD-10-CM    1. Chronic pain of right ankle  M25.571     G89.29       2. Tear of left supraspinatus tendon  M75.102       3. Left shoulder pain, unspecified chronicity  M25.512           Start Time: 1300  Stop Time: 1350  Total time in clinic (min): 50 minutes  1 on 1 45 minutes all else IEP     Assessment  Impairments: abnormal coordination, abnormal muscle firing, abnormal muscle tone, abnormal or restricted ROM, abnormal movement, activity intolerance, impaired balance, impaired physical strength, lacks appropriate home exercise program, pain with function, weight-bearing intolerance, unable to perform ADL, participation limitations, activity limitations and endurance  Symptom irritability: moderate    Assessment details: Problem List/Impairments:  1) R ankle/L shoulder hypomobility  2) R ankle/left shoulder neuromotor/force deficits    Ash Da Silva is a pleasant 44 y.o. male who has been seen for rehabilitation following left shoulder RTC repair and currently presents with chronic right ankle pain. He presents upon exam with problem list/impairments noted above, nociceptive pain type, resulting in the pain he is experiencing, worry over not knowing what's wrong, fear of not being able to keep active, and future ill health (and wanting to prevent it). No further referral appears necessary at this time based upon examination results. I expect he will improve in 8-12 weeks. Positive prognostic indicators include positive attitude toward recovery, good understanding of diagnosis and treatment plan options, and absence of observed red flags. Negative prognostic indicators include chronicity of symptoms, high symptom irritability, and multiple concurrent orthopedic problems. Patient was provided with  a customized home exercise program to begin performing on their own. All patient questions and concerns have been addressed at this time. Thank you for the kind referral  Understanding of Dx/Px/POC: good     Prognosis: good    Goals  Ankle Goals:  Short Term Goals:   1. Patient will be independent with a customized HEP.  2. Patient will report at least 40% improvement overall with performance of ADL's/running.  3. Patient will improve pain with activity by 50%.  4. Patient will demonstrate functional R ankle mobility.  5. Patient will demonstrate ability to perform 10 right SL heel raises with minimal to no limitations.    Long Term Goals:   1. Patient will improve FOTO to greater than goal of 77.  2. Patient will continue with HEP independence to allow for decreased future reoccurrence of pain and loss in function.  3. Patient will report at least 80% improvement overall with performance of ADL's/running.    Shoulder Goals:  Short Term Goals: to be achieved by 4 weeks  1) Patient to be independent with basic HEP. - MET  2) Decrease pain to 2/10 at its worst. - MET  3) Increase shoulder flexion PROM to 90 deg. - MET  4) Increase shoulder abduction PROM to 60 deg. - MET  5) Increase shoulder ER PROM at 30 deg abduction to 30 deg. - MET  6) Increase shoulder IR PROM at 30 deg abduction to 45 deg. - MET  7) Patient to report decreased sleep interruption secondary to pain. - MET    Long Term Goals: to be achieved by discharge  1) FOTO equal to or greater than goal of 77. - MET  2) Patient to be independent with comprehensive HEP. - IN PROGRESS  3) Improve pain to 1/10 or less with activity for improved quality of life. - MET  4) Increase shoulder ROM to within 5 deg of contralateral UE to improve a/iadls.  - IN PROGRESS  5) Increase shoulder strength to 4+/5 MMT grade in all planes to improve a/iadls. - IN PROGRESS  6) Patient to report no sleep interruption secondary to pain. - MET  7) Patient to return to full  work/recreational activity with minimal to no limitation. - MET    Plan  Patient would benefit from: PT eval and skilled physical therapy  Planned modality interventions: cryotherapy, TENS, thermotherapy: hydrocollator packs, low level laser therapy and electrical stimulation/Russian stimulation  Other planned modality interventions: EPAT. BFR.    Planned therapy interventions: manual therapy, massage, joint mobilization, coordination, graded exercise, graded activity, functional ROM exercises, therapeutic exercise, therapeutic activities, patient education, neuromuscular re-education, home exercise program, flexibility, strengthening, IASTM, kinesiology taping, Davidson taping, nerve gliding, balance/weight bearing training, body mechanics training and stretching    Frequency: 1-2x week  Duration in weeks: 12  Plan of Care beginning date: 9/10/2024  Plan of Care expiration date: 12/3/2024  Treatment plan discussed with: patient  Plan details: Prognosis above is given PT service 1-2x/week over the next 2-3 months and home program adherence.        Subjective Evaluation    History of Present Illness  Mechanism of injury: surgery and trauma  Mechanism of injury: Patient is a 44 y.o. male presenting to physical therapy with chief complaint of right ankle pain. Patient reports this episode of pain/symptoms has been going on since an injury in 2019 that was recently exacerbated early this year (2024). Patient reports his pain has been worsening with higher level activities, such as running, as well as with performing heel raises. Patient reports pain is intermittent. Patient denies numbness/tingling. Patient reports right gastrocnemius/soleus weakness.     Patient reports his left shoulder has improved tremendously since surgery, but does report continued stiffness especially with backstroke swim and that his strength is still not 100% but continued to gradually improve.   Quality of life: fair    Patient Goals  Patient  "goals for therapy: independence with ADLs/IADLs, decreased pain, increased strength, increased motion, return to sport/leisure activities and improved balance  Patient goal: \"I want to get back to my normal daily activities, workouts, and runs without my ankle hurting/limiting me.\"  Pain  Current pain ratin  At best pain ratin  At worst pain ratin  Location: Lateral right ankle/distal achilles  Quality: sharp and dull ache (\"fullness\")  Relieving factors: rest  Aggravating factors: running and stair climbing (heel raise)    Social Support    Employment status: working ([employer])  Exercise history: running, working out, swimming          Objective    Posture/Observation: Mild swelling surrounding right lateral malleoli  Palpation: TTP posterior to right lateral malleoli (peroneal tendon) and lateral/distal achilles  Myotomes (L/R): Intact B/L LE  Dermatome: (pinprick- L/R): Slightly diminished R S1, all else intact        GAIT:Unremarkable        MMT         AROM          PROM    Hip       L      R          L        R         L       R   ER.         G. Max         G. Med         Iliop.         .         Knee         Extension 5 5       Flexion 5 5                Ankle         Dorsi Flexion 5 5 75% 50%     Plantar Flexion  P! DNT WFL WFL     Inversion 5 5 WFL WFL P! Lateral ankle end range inversion     Eversion 5 5 P! WFL WFL                Neuro Dynamic Testing:  Straight leg raise:   L= -     R= -  (negative with all nerve biases at ankle)                               Segmental mobility:   TCJ: B/L Hypomobile (R more than L)   Cuboid: R Hypomobile            Precautions: L RTC Repair Dr. Priscilla GUERRERO 24.     Access Code: [email or web address]    POC expires Unit limit Auth Expiration date PT/OT + Visit Limit?   9/10/24 BOMN N/A BOMN   12/3/24 BOMN N/A BOMN                       Visit 10 11 IE R Ankle    Manuals 7/19/24 9/10/24    Shoulder PROM      Soft Tissue Deformation      Scapular Mobs      Cuboid " "Gr V  GJL    TCJ Mobs  GJL Gr III-IV and Gr V    EPAT  GJL R distal/ lateral achilles/ peroneals  1.0 BAR  21Hz 2000 pulses                Assessment GJL GJL R Ankle IE    Neuro Re-Ed      Scap retraction Orange Tube 30x HEP     LPD Orange Tube 30x HEP     IR Orange Tube 30x HEP     ER Gregory Tube 30x HEP     Pball T 20x HEP     Wall scap setting      Scap set with flexion      Shoulder iso                                                Ther Ex      Elbow Flexion 5# 20x HEP     Triceps Ext Ammy 12# 30x HEP     Wall Push-Up 2x10 HEP     Pulley with scap setting      Pendulum      Elbow flexion PROM self      Cervical AROM      Wrist AROM            Table slide flexion PROM      Wall slide flexion      Wall walk flexion stretch      Pball flexion      Supine Flexion      Supine dowel ER      IR stretch strap HEP     Cross body stretch HEP     Seated HR  Post tib ball 30x HEP    Soleus stretch/TCJ mob  30x3\" HEP    Seated DF  Purple TB 30x3\" HEP                                  Ther Activity                  Gait Training                  Modalities                         "

## 2024-09-16 ENCOUNTER — APPOINTMENT (OUTPATIENT)
Dept: PHYSICAL THERAPY | Facility: OTHER | Age: 44
End: 2024-09-16
Payer: COMMERCIAL

## 2024-09-16 DIAGNOSIS — M25.512 LEFT SHOULDER PAIN, UNSPECIFIED CHRONICITY: ICD-10-CM

## 2024-09-16 DIAGNOSIS — M75.102 TEAR OF LEFT SUPRASPINATUS TENDON: ICD-10-CM

## 2024-09-16 DIAGNOSIS — G89.29 CHRONIC PAIN OF RIGHT ANKLE: Primary | ICD-10-CM

## 2024-09-16 DIAGNOSIS — M25.571 CHRONIC PAIN OF RIGHT ANKLE: Primary | ICD-10-CM

## 2024-09-16 NOTE — PROGRESS NOTES
"Daily Note     Today's date: 2024  Patient name: Ash Da Silva  : 1980  MRN: [id number]  Referring provider: Alejandra Pace P*  Dx:   Encounter Diagnosis     ICD-10-CM    1. Chronic pain of right ankle  M25.571     G89.29       2. Tear of left supraspinatus tendon  M75.102       3. Left shoulder pain, unspecified chronicity  M25.512                      Subjective: ***      Objective: See treatment diary below      Assessment: Tolerated treatment {Tolerated treatment :[id number]}. Patient {assessment:[id number]}      Plan: Continue per plan of care.      Precautions: L RTC Repair Dr. Priscilla GUERRERO 24.     Access Code: [email or web address]    POC expires Unit limit Auth Expiration date PT/OT + Visit Limit?   9/10/24 BOMN N/A BOMN   12/3/24 BOMN N/A BOMN                       Visit 10 11 IE R Ankle 12    Manuals 7/19/24 9/10/24 9/16/24    Shoulder PROM       Soft Tissue Deformation       Scapular Mobs       Cuboid Gr V  GJL     TCJ Mobs  GJL Gr III-IV and Gr V     EPAT  GJL R distal/ lateral achilles/ peroneals  1.0 BAR  21Hz 2000 pulses                   Assessment GJL GJL R Ankle IE     Neuro Re-Ed       Scap retraction Orange Tube 30x HEP      LPD Russell Tube 30x HEP      IR Orange Tube 30x HEP      ER Russell Tube 30x HEP      Pball T 20x HEP      Wall scap setting       Scap set with flexion       Shoulder iso                                                        Ther Ex       Elbow Flexion 5# 20x HEP      Triceps Ext P[place]ton 12# 30x HEP      Wall Push-Up 2x10 HEP      Pulley with scap setting       Pendulum       Elbow flexion PROM self       Cervical AROM       Wrist AROM              Table slide flexion PROM       Wall slide flexion       Wall walk flexion stretch       Pball flexion       Supine Flexion       Supine dowel ER       IR stretch strap HEP      Cross body stretch HEP      Seated HR  Post tib ball 30x HEP     Soleus stretch/TCJ mob  30x3\" HEP     Seated DF  Purple TB 30x3\" HEP          "                               Ther Activity                     Gait Training                     Modalities

## 2024-10-04 ENCOUNTER — ANESTHESIA (OUTPATIENT)
Dept: ANESTHESIOLOGY | Facility: HOSPITAL | Age: 44
End: 2024-10-04

## 2024-10-04 ENCOUNTER — ANESTHESIA EVENT (OUTPATIENT)
Dept: ANESTHESIOLOGY | Facility: HOSPITAL | Age: 44
End: 2024-10-04

## 2024-10-10 ENCOUNTER — TELEPHONE (OUTPATIENT)
Dept: GASTROENTEROLOGY | Facility: CLINIC | Age: 44
End: 2024-10-10

## 2024-10-10 NOTE — TELEPHONE ENCOUNTER
Lm confirming pts procedure for 10/18 with Dr. Mcknight. He will need a  and will be called day prior with his arrival time. If he still needs his procedure instructions, they are in his my chart. Any questions, he may call.

## 2024-10-18 ENCOUNTER — ANESTHESIA EVENT (OUTPATIENT)
Dept: ANESTHESIOLOGY | Facility: HOSPITAL | Age: 44
End: 2024-10-18

## 2024-10-18 ENCOUNTER — HOSPITAL ENCOUNTER (OUTPATIENT)
Dept: GASTROENTEROLOGY | Facility: AMBULARY SURGERY CENTER | Age: 44
Setting detail: OUTPATIENT SURGERY
End: 2024-10-18
Attending: INTERNAL MEDICINE
Payer: COMMERCIAL

## 2024-10-18 ENCOUNTER — ANESTHESIA (OUTPATIENT)
Dept: ANESTHESIOLOGY | Facility: HOSPITAL | Age: 44
End: 2024-10-18

## 2024-10-18 ENCOUNTER — ANESTHESIA (OUTPATIENT)
Dept: GASTROENTEROLOGY | Facility: AMBULARY SURGERY CENTER | Age: 44
End: 2024-10-18
Payer: COMMERCIAL

## 2024-10-18 ENCOUNTER — APPOINTMENT (OUTPATIENT)
Dept: LAB | Facility: CLINIC | Age: 44
End: 2024-10-18
Payer: COMMERCIAL

## 2024-10-18 VITALS
HEART RATE: 63 BPM | WEIGHT: 173 LBS | DIASTOLIC BLOOD PRESSURE: 82 MMHG | BODY MASS INDEX: 26.22 KG/M2 | HEIGHT: 68 IN | OXYGEN SATURATION: 97 % | SYSTOLIC BLOOD PRESSURE: 120 MMHG | TEMPERATURE: 98.2 F | RESPIRATION RATE: 20 BRPM

## 2024-10-18 DIAGNOSIS — K20.0 EOSINOPHILIC ESOPHAGITIS: ICD-10-CM

## 2024-10-18 DIAGNOSIS — E78.5 DYSLIPIDEMIA: ICD-10-CM

## 2024-10-18 DIAGNOSIS — K90.0 CELIAC SPRUE: ICD-10-CM

## 2024-10-18 LAB
CHOLEST SERPL-MCNC: 167 MG/DL
HDLC SERPL-MCNC: 52 MG/DL
LDLC SERPL CALC-MCNC: 103 MG/DL (ref 0–100)
NONHDLC SERPL-MCNC: 115 MG/DL
TRIGL SERPL-MCNC: 60 MG/DL

## 2024-10-18 PROCEDURE — 43239 EGD BIOPSY SINGLE/MULTIPLE: CPT | Performed by: INTERNAL MEDICINE

## 2024-10-18 PROCEDURE — 88305 TISSUE EXAM BY PATHOLOGIST: CPT | Performed by: PATHOLOGY

## 2024-10-18 RX ORDER — SODIUM CHLORIDE, SODIUM LACTATE, POTASSIUM CHLORIDE, CALCIUM CHLORIDE 600; 310; 30; 20 MG/100ML; MG/100ML; MG/100ML; MG/100ML
INJECTION, SOLUTION INTRAVENOUS CONTINUOUS PRN
Status: DISCONTINUED | OUTPATIENT
Start: 2024-10-18 | End: 2024-10-18

## 2024-10-18 RX ORDER — PROPOFOL 10 MG/ML
INJECTION, EMULSION INTRAVENOUS CONTINUOUS PRN
Status: DISCONTINUED | OUTPATIENT
Start: 2024-10-18 | End: 2024-10-18

## 2024-10-18 RX ORDER — PROPOFOL 10 MG/ML
INJECTION, EMULSION INTRAVENOUS AS NEEDED
Status: DISCONTINUED | OUTPATIENT
Start: 2024-10-18 | End: 2024-10-18

## 2024-10-18 RX ORDER — LIDOCAINE HYDROCHLORIDE 20 MG/ML
INJECTION, SOLUTION EPIDURAL; INFILTRATION; INTRACAUDAL; PERINEURAL AS NEEDED
Status: DISCONTINUED | OUTPATIENT
Start: 2024-10-18 | End: 2024-10-18

## 2024-10-18 RX ADMIN — PROPOFOL 100 MG: 10 INJECTION, EMULSION INTRAVENOUS at 12:15

## 2024-10-18 RX ADMIN — PROPOFOL 20 MG: 10 INJECTION, EMULSION INTRAVENOUS at 12:16

## 2024-10-18 RX ADMIN — SODIUM CHLORIDE, SODIUM LACTATE, POTASSIUM CHLORIDE, AND CALCIUM CHLORIDE: .6; .31; .03; .02 INJECTION, SOLUTION INTRAVENOUS at 12:06

## 2024-10-18 RX ADMIN — PROPOFOL 120 MCG/KG/MIN: 10 INJECTION, EMULSION INTRAVENOUS at 12:15

## 2024-10-18 RX ADMIN — LIDOCAINE HYDROCHLORIDE 100 MG: 20 INJECTION, SOLUTION EPIDURAL; INFILTRATION; INTRACAUDAL at 12:15

## 2024-10-18 NOTE — ANESTHESIA PREPROCEDURE EVALUATION
Procedure:  EGD    Relevant Problems   ANESTHESIA (within normal limits)      ENDO (within normal limits)      GI/HEPATIC   (+) Gastroesophageal reflux disease without esophagitis      /RENAL (within normal limits)      HEMATOLOGY (within normal limits)      NEURO/PSYCH (within normal limits)      PULMONARY (within normal limits)      Other   (+) Dyslipidemia      Lab Results   Component Value Date    WBC 4.16 (L) 09/29/2023    HGB 15.1 09/29/2023    HCT 45.1 09/29/2023    MCV 92 09/29/2023     09/29/2023     Lab Results   Component Value Date    SODIUM 141 04/08/2024    K 4.3 04/08/2024     04/08/2024    CO2 31 04/08/2024    BUN 16 04/08/2024    CREATININE 0.98 04/08/2024    GLUC 86 04/08/2024    CALCIUM 9.6 04/08/2024     Lab Results   Component Value Date    INR 1.11 10/28/2018    PROTIME 14.0 10/28/2018     Lab Results   Component Value Date    HGBA1C 5.5 09/29/2023          Physical Exam    Airway    Mallampati score: I  TM Distance: >3 FB  Neck ROM: full     Dental   No notable dental hx     Cardiovascular  Cardiovascular exam normal    Pulmonary  Pulmonary exam normal     Other Findings        Anesthesia Plan  ASA Score- 2     Anesthesia Type- IV sedation with anesthesia with ASA Monitors.         Additional Monitors:     Airway Plan:            Plan Factors-Exercise tolerance (METS): >4 METS.    Chart reviewed. EKG reviewed.  Existing labs reviewed. Patient summary reviewed.                  Induction- intravenous.    Postoperative Plan-         Informed Consent- Anesthetic plan and risks discussed with patient.  I personally reviewed this patient with the CRNA. Discussed and agreed on the Anesthesia Plan with the CRNA..

## 2024-10-18 NOTE — H&P
"History and Physical -  [facility]  Ash Da Silva 44 y.o. male MRN: [id number]    HPI: Ash Da Silva is a 44 y.o. year old male who presents with celiac, EoE      Review of Systems    Historical Information   Past Medical History:   Diagnosis Date    COVID     Hyperlipidemia      Past Surgical History:   Procedure Laterality Date    COLONOSCOPY      DENTAL SURGERY      OR SURGICAL ARTHROSCOPY SHOULDER W/ROTATOR CUFF RPR Left 4/17/2024    Procedure: SHOULDER ARTHROSCOPIC ROTATOR CUFF REPAIR, BICEPS REPAIR, SAD;  Surgeon: Pratik Wells MD;  [facility]: [facility] MAIN [facility];  Service: Orthopedics    TONSILLECTOMY       Social History   Social History     Substance and Sexual Activity   Alcohol Use Yes    Comment: 4 per week     Social History     Substance and Sexual Activity   Drug Use No     Social History     Tobacco Use   Smoking Status Never   Smokeless Tobacco Never     Family History   Problem Relation Age of Onset    Hyperlipidemia Mother     Heart disease Father     COPD Father     Hypertension Neg Hx        Meds/Allergies     Not in a hospital admission.    No Known Allergies    Objective     /72   Pulse (!) 54   Temp (!) 96.7 °F (35.9 °C) (Temporal)   Resp 18   Ht 5' 8\" (1.727 m)   Wt 78.5 kg (173 lb)   SpO2 98%   BMI 26.30 kg/m²       PHYSICAL EXAM    Gen: NAD  CV: RRR  CHEST: Clear  ABD: soft, NT/ND  EXT: no edema  Neuro: AAO      ASSESSMENT/PLAN:  This is a 44 y.o. year old male here for celiac, EoE    PLAN:   Procedure: egd      "

## 2024-10-18 NOTE — ANESTHESIA PREPROCEDURE EVALUATION
Procedure:  PRE-OP ONLY    Relevant Problems   GI/HEPATIC   (+) Gastroesophageal reflux disease without esophagitis      Lab Results   Component Value Date    WBC 4.16 (L) 09/29/2023    HGB 15.1 09/29/2023    HCT 45.1 09/29/2023    MCV 92 09/29/2023     09/29/2023     Lab Results   Component Value Date    SODIUM 141 04/08/2024    K 4.3 04/08/2024     04/08/2024    CO2 31 04/08/2024    BUN 16 04/08/2024    CREATININE 0.98 04/08/2024    GLUC 86 04/08/2024    CALCIUM 9.6 04/08/2024     Lab Results   Component Value Date    INR 1.11 10/28/2018    PROTIME 14.0 10/28/2018     Lab Results   Component Value Date    HGBA1C 5.5 09/29/2023               Anesthesia Plan  ASA Score- 2     Anesthesia Type- IV sedation with anesthesia with ASA Monitors.         Additional Monitors:     Airway Plan:            Plan Factors-    Chart reviewed. EKG reviewed.  Existing labs reviewed. Patient summary reviewed.                  Induction- intravenous.    Postoperative Plan-         Informed Consent-

## 2024-10-18 NOTE — ANESTHESIA POSTPROCEDURE EVALUATION
Post-Op Assessment Note    CV Status:  Stable    Pain management: adequate       Mental Status:  Alert and awake   Hydration Status:  Euvolemic   PONV Controlled:  Controlled   Airway Patency:  Patent     Post Op Vitals Reviewed: Yes    No anethesia notable event occurred.    Staff: Anesthesiologist           Last Filed PACU Vitals:  Vitals Value Taken Time   Temp 98.2 °F (36.8 °C) 10/18/24 1225   Pulse 63 10/18/24 1248   /82 10/18/24 1248   Resp 20 10/18/24 1248   SpO2 97 % 10/18/24 1248       Modified Hussain:  Activity: 2 (10/18/2024 12:35 PM)  Respiration: 2 (10/18/2024 12:35 PM)  Circulation: 2 (10/18/2024 12:35 PM)  Consciousness: 2 (10/18/2024 12:35 PM)  Oxygen Saturation: 2 (10/18/2024 12:35 PM)  Modified Hussain Score: 10 (10/18/2024 12:35 PM)

## 2024-10-24 DIAGNOSIS — K90.0 CELIAC SPRUE: Primary | ICD-10-CM

## 2024-10-24 PROCEDURE — 88305 TISSUE EXAM BY PATHOLOGIST: CPT | Performed by: PATHOLOGY

## 2024-10-25 ENCOUNTER — TELEPHONE (OUTPATIENT)
Dept: GASTROENTEROLOGY | Facility: CLINIC | Age: 44
End: 2024-10-25

## 2024-10-25 ENCOUNTER — APPOINTMENT (OUTPATIENT)
Dept: LAB | Facility: CLINIC | Age: 44
End: 2024-10-25
Payer: COMMERCIAL

## 2024-10-25 DIAGNOSIS — K90.0 CELIAC SPRUE: ICD-10-CM

## 2024-10-25 LAB — IGA SERPL-MCNC: 203 MG/DL (ref 66–433)

## 2024-10-25 PROCEDURE — 86364 TISS TRNSGLTMNASE EA IG CLAS: CPT

## 2024-10-25 PROCEDURE — 82784 ASSAY IGA/IGD/IGG/IGM EACH: CPT

## 2024-10-25 PROCEDURE — 36415 COLL VENOUS BLD VENIPUNCTURE: CPT

## 2024-10-25 PROCEDURE — 86258 DGP ANTIBODY EACH IG CLASS: CPT

## 2024-10-25 PROCEDURE — 82785 ASSAY OF IGE: CPT

## 2024-10-25 PROCEDURE — 86003 ALLG SPEC IGE CRUDE XTRC EA: CPT

## 2024-10-25 PROCEDURE — 86008 ALLG SPEC IGE RECOMB EA: CPT

## 2024-10-25 NOTE — TELEPHONE ENCOUNTER
Written by Spike Mcknight MD on 10/24/2024  9:41 PM EDT  Seen by patient Ash MATTHEWS Rekha on 10/24/2024 10:52 PM    Recall set 6m fu ov

## 2024-10-25 NOTE — TELEPHONE ENCOUNTER
----- Message from Spike Mcknight MD sent at 10/24/2024  9:41 PM EDT -----  Office staff please schedule 6-month follow-up with me.  This is one of our cardiologist here.    Great news, overall things look much better.    The biopsies from your small intestine do not show any signs of celiac sprue at this time which is great.    The biopsies from your esophagus continue to show an elevated number of eosinophils but significantly improved compared to your first endoscopy.    Please continue with a strict gluten-free diet.    I have ordered blood test to repeat your celiac serologies and also to check for food allergens.    For now we will continue the pantoprazole.    Please let me know if you have any change or worsening symptoms which may alter what medication to use.    We should have a follow-up in 6 months time.  An office visit would be good.

## 2024-10-26 LAB
GLIADIN PEPTIDE IGA SER-ACNC: 1.2 U/ML
GLIADIN PEPTIDE IGA SER-ACNC: NEGATIVE
GLIADIN PEPTIDE IGG SER-ACNC: 1 U/ML
GLIADIN PEPTIDE IGG SER-ACNC: NEGATIVE
TTG IGA SER-ACNC: 12.4 U/ML
TTG IGA SER-ACNC: NEGATIVE
TTG IGG SER-ACNC: <0.8 U/ML
TTG IGG SER-ACNC: NEGATIVE

## 2024-10-28 LAB
ALMOND IGE QN: 0.11 KUA/I
CASHEW NUT IGE QN: <0.1 KUA/I
CODFISH IGE QN: <0.1 KUA/I
EGG WHITE IGE QN: <0.1 KUA/I
GLUTEN IGE QN: <0.1 KUA/I
HAZELNUT IGE QN: 0.1 KUA/L
MILK IGE QN: 0.21 KUA/I
PEANUT IGE QN: 0.13 KUA/I
SALMON IGE QN: <0.1 KUA/I
SCALLOP IGE QN: <0.1 KUA/L
SESAME SEED IGE QN: 0.16 KUA/I
SHRIMP IGE QN: <0.1 KUA/L
SOYBEAN IGE QN: <0.1 KUA/I
TOTAL IGE SMQN RAST: 42.4 KU/L (ref 0–113)
TUNA IGE QN: <0.1 KUA/I
WALNUT IGE QN: <0.1 KUA/I
WHEAT IGE QN: 0.12 KUA/I

## 2024-10-29 LAB
A-LACTALB IGE QN: 0.35 KAU/I
ARA H6 PEANUT: <0.1 KUA/I
B-LACTOGLOB IGE QN: 0.24 KAU/I
CASEIN IGE QN: <0.1 KAU/I
PEANUT (RARA H) 1 IGE QN: <0.1 KUA/I
PEANUT (RARA H) 2 IGE QN: <0.1 KUA/I
PEANUT (RARA H) 3 IGE QN: <0.1 KUA/I
PEANUT (RARA H) 8 IGE QN: <0.1 KUA/I
PEANUT (RARA H) 9 IGE QN: <0.1 KUA/I

## 2024-11-11 DIAGNOSIS — Z12.83 SCREENING EXAM FOR SKIN CANCER: Primary | ICD-10-CM

## 2024-11-13 DIAGNOSIS — K21.9 GASTROESOPHAGEAL REFLUX DISEASE WITHOUT ESOPHAGITIS: ICD-10-CM

## 2024-11-13 RX ORDER — PANTOPRAZOLE SODIUM 40 MG/1
40 TABLET, DELAYED RELEASE ORAL DAILY
Qty: 90 TABLET | Refills: 1 | Status: SHIPPED | OUTPATIENT
Start: 2024-11-13

## 2024-11-13 NOTE — TELEPHONE ENCOUNTER
Reason for call:   [x] Refill   [] Prior Auth  [] Other:     Office:   [] PCP/Provider -   [x] Specialty/Provider -     Medication: pantoprazole (PROTONIX) 40 mg tablet     Dose/Frequency: take 1 tablet by mouth once daily,     Quantity: 90 tab    Pharmacy: [facility] - [place] - [address]     Does the patient have enough for 3 days?   [x] Yes   [] No - Send as HP to POD

## 2024-12-11 PROBLEM — Z12.83 SCREENING EXAM FOR SKIN CANCER: Status: RESOLVED | Noted: 2024-11-11 | Resolved: 2024-12-11

## 2025-01-16 ENCOUNTER — APPOINTMENT (OUTPATIENT)
Dept: LAB | Facility: CLINIC | Age: 45
End: 2025-01-16
Payer: COMMERCIAL

## 2025-01-16 DIAGNOSIS — K90.0 CELIAC DISEASE: ICD-10-CM

## 2025-01-16 LAB
25(OH)D3 SERPL-MCNC: 39.2 NG/ML (ref 30–100)
ALBUMIN SERPL BCG-MCNC: 4.8 G/DL (ref 3.5–5)
ALP SERPL-CCNC: 66 U/L (ref 34–104)
ALT SERPL W P-5'-P-CCNC: 36 U/L (ref 7–52)
ANION GAP SERPL CALCULATED.3IONS-SCNC: 6 MMOL/L (ref 4–13)
AST SERPL W P-5'-P-CCNC: 27 U/L (ref 13–39)
BASOPHILS # BLD AUTO: 0.04 THOUSANDS/ΜL (ref 0–0.1)
BASOPHILS NFR BLD AUTO: 1 % (ref 0–1)
BILIRUB DIRECT SERPL-MCNC: 0.17 MG/DL (ref 0–0.2)
BILIRUB SERPL-MCNC: 1.06 MG/DL (ref 0.2–1)
BUN SERPL-MCNC: 20 MG/DL (ref 5–25)
CALCIUM SERPL-MCNC: 9.8 MG/DL (ref 8.4–10.2)
CHLORIDE SERPL-SCNC: 103 MMOL/L (ref 96–108)
CO2 SERPL-SCNC: 32 MMOL/L (ref 21–32)
CREAT SERPL-MCNC: 1.2 MG/DL (ref 0.6–1.3)
EOSINOPHIL # BLD AUTO: 0.24 THOUSAND/ΜL (ref 0–0.61)
EOSINOPHIL NFR BLD AUTO: 5 % (ref 0–6)
ERYTHROCYTE [DISTWIDTH] IN BLOOD BY AUTOMATED COUNT: 12.5 % (ref 11.6–15.1)
FERRITIN SERPL-MCNC: 64 NG/ML (ref 24–336)
FOLATE SERPL-MCNC: 10.4 NG/ML
GFR SERPL CREATININE-BSD FRML MDRD: 72 ML/MIN/1.73SQ M
GLUCOSE SERPL-MCNC: 97 MG/DL (ref 65–140)
HCT VFR BLD AUTO: 43.1 % (ref 36.5–49.3)
HGB BLD-MCNC: 14.2 G/DL (ref 12–17)
IMM GRANULOCYTES # BLD AUTO: 0.01 THOUSAND/UL (ref 0–0.2)
IMM GRANULOCYTES NFR BLD AUTO: 0 % (ref 0–2)
LYMPHOCYTES # BLD AUTO: 1.68 THOUSANDS/ΜL (ref 0.6–4.47)
LYMPHOCYTES NFR BLD AUTO: 33 % (ref 14–44)
MCH RBC QN AUTO: 30.5 PG (ref 26.8–34.3)
MCHC RBC AUTO-ENTMCNC: 32.9 G/DL (ref 31.4–37.4)
MCV RBC AUTO: 93 FL (ref 82–98)
MONOCYTES # BLD AUTO: 0.41 THOUSAND/ΜL (ref 0.17–1.22)
MONOCYTES NFR BLD AUTO: 8 % (ref 4–12)
NEUTROPHILS # BLD AUTO: 2.75 THOUSANDS/ΜL (ref 1.85–7.62)
NEUTS SEG NFR BLD AUTO: 53 % (ref 43–75)
NRBC BLD AUTO-RTO: 0 /100 WBCS
PLATELET # BLD AUTO: 223 THOUSANDS/UL (ref 149–390)
PMV BLD AUTO: 10.4 FL (ref 8.9–12.7)
POTASSIUM SERPL-SCNC: 3.8 MMOL/L (ref 3.5–5.3)
PROT SERPL-MCNC: 7.1 G/DL (ref 6.4–8.4)
RBC # BLD AUTO: 4.65 MILLION/UL (ref 3.88–5.62)
SODIUM SERPL-SCNC: 141 MMOL/L (ref 135–147)
TSH SERPL DL<=0.05 MIU/L-ACNC: 0.88 UIU/ML (ref 0.45–4.5)
VIT B12 SERPL-MCNC: 351 PG/ML (ref 180–914)
WBC # BLD AUTO: 5.13 THOUSAND/UL (ref 4.31–10.16)

## 2025-01-16 PROCEDURE — 82306 VITAMIN D 25 HYDROXY: CPT

## 2025-01-16 PROCEDURE — 80076 HEPATIC FUNCTION PANEL: CPT

## 2025-01-16 PROCEDURE — 84443 ASSAY THYROID STIM HORMONE: CPT

## 2025-01-16 PROCEDURE — 85025 COMPLETE CBC W/AUTO DIFF WBC: CPT

## 2025-01-16 PROCEDURE — 80048 BASIC METABOLIC PNL TOTAL CA: CPT

## 2025-01-16 PROCEDURE — 82728 ASSAY OF FERRITIN: CPT

## 2025-01-16 PROCEDURE — 82607 VITAMIN B-12: CPT

## 2025-01-16 PROCEDURE — 82746 ASSAY OF FOLIC ACID SERUM: CPT

## 2025-01-16 PROCEDURE — 36415 COLL VENOUS BLD VENIPUNCTURE: CPT

## 2025-01-16 PROCEDURE — 83918 ORGANIC ACIDS TOTAL QUANT: CPT

## 2025-01-22 LAB — METHYLMALONATE SERPL-SCNC: 188 NMOL/L (ref 0–378)

## 2025-01-31 DIAGNOSIS — E78.5 DYSLIPIDEMIA: Primary | ICD-10-CM

## 2025-01-31 DIAGNOSIS — Z13.1 DIABETES MELLITUS SCREENING: ICD-10-CM

## 2025-02-01 ENCOUNTER — HOSPITAL ENCOUNTER (OUTPATIENT)
Dept: RADIOLOGY | Facility: HOSPITAL | Age: 45
Discharge: HOME | End: 2025-02-01
Payer: COMMERCIAL

## 2025-02-01 DIAGNOSIS — M25.519 SHOULDER PAIN: ICD-10-CM

## 2025-02-01 DIAGNOSIS — M25.519 SHOULDER PAIN: Primary | ICD-10-CM

## 2025-02-01 PROCEDURE — 73030 X-RAY EXAM OF SHOULDER: CPT

## 2025-03-02 PROBLEM — Z13.1 DIABETES MELLITUS SCREENING: Status: RESOLVED | Noted: 2021-12-02 | Resolved: 2025-03-02

## 2025-03-03 ENCOUNTER — APPOINTMENT (OUTPATIENT)
Dept: LAB | Facility: CLINIC | Age: 45
End: 2025-03-03
Payer: COMMERCIAL

## 2025-03-03 DIAGNOSIS — E78.5 DYSLIPIDEMIA: Primary | ICD-10-CM

## 2025-03-03 DIAGNOSIS — R25.2 MUSCLE CRAMPS: ICD-10-CM

## 2025-03-03 DIAGNOSIS — Z13.1 DIABETES MELLITUS SCREENING: ICD-10-CM

## 2025-03-03 DIAGNOSIS — E78.5 DYSLIPIDEMIA: ICD-10-CM

## 2025-03-03 LAB
ALBUMIN SERPL BCG-MCNC: 4.5 G/DL (ref 3.5–5)
ALP SERPL-CCNC: 60 U/L (ref 34–104)
ALT SERPL W P-5'-P-CCNC: 31 U/L (ref 7–52)
ANION GAP SERPL CALCULATED.3IONS-SCNC: 5 MMOL/L (ref 4–13)
AST SERPL W P-5'-P-CCNC: 22 U/L (ref 13–39)
BILIRUB SERPL-MCNC: 1.26 MG/DL (ref 0.2–1)
BUN SERPL-MCNC: 26 MG/DL (ref 5–25)
CALCIUM SERPL-MCNC: 9.4 MG/DL (ref 8.4–10.2)
CHLORIDE SERPL-SCNC: 104 MMOL/L (ref 96–108)
CO2 SERPL-SCNC: 30 MMOL/L (ref 21–32)
CREAT SERPL-MCNC: 1.06 MG/DL (ref 0.6–1.3)
EST. AVERAGE GLUCOSE BLD GHB EST-MCNC: 108 MG/DL
GFR SERPL CREATININE-BSD FRML MDRD: 83 ML/MIN/1.73SQ M
GLUCOSE SERPL-MCNC: 66 MG/DL (ref 65–140)
HBA1C MFR BLD: 5.4 %
POTASSIUM SERPL-SCNC: 4.4 MMOL/L (ref 3.5–5.3)
PROT SERPL-MCNC: 6.6 G/DL (ref 6.4–8.4)
SODIUM SERPL-SCNC: 139 MMOL/L (ref 135–147)

## 2025-03-03 PROCEDURE — 80053 COMPREHEN METABOLIC PANEL: CPT

## 2025-03-03 PROCEDURE — 36415 COLL VENOUS BLD VENIPUNCTURE: CPT

## 2025-03-03 PROCEDURE — 83735 ASSAY OF MAGNESIUM: CPT

## 2025-03-03 PROCEDURE — 83036 HEMOGLOBIN GLYCOSYLATED A1C: CPT

## 2025-03-06 LAB — MAGNESIUM RBC-MCNC: 4.7 MG/DL (ref 3.7–7)

## 2025-03-15 ENCOUNTER — APPOINTMENT (OUTPATIENT)
Dept: LAB | Facility: CLINIC | Age: 45
End: 2025-03-15
Payer: COMMERCIAL

## 2025-03-15 LAB
CHOLEST SERPL-MCNC: 169 MG/DL (ref ?–200)
HDLC SERPL-MCNC: 54 MG/DL
LDLC SERPL CALC-MCNC: 101 MG/DL (ref 0–100)
NONHDLC SERPL-MCNC: 115 MG/DL
TRIGL SERPL-MCNC: 71 MG/DL (ref ?–150)

## 2025-03-15 PROCEDURE — 36415 COLL VENOUS BLD VENIPUNCTURE: CPT

## 2025-03-15 PROCEDURE — 80061 LIPID PANEL: CPT

## 2025-03-15 PROCEDURE — 83695 ASSAY OF LIPOPROTEIN(A): CPT

## 2025-03-17 LAB — LPA SERPL-SCNC: 139.3 NMOL/L

## 2025-03-18 ENCOUNTER — RESULTS FOLLOW-UP (OUTPATIENT)
Dept: FAMILY MEDICINE CLINIC | Facility: CLINIC | Age: 45
End: 2025-03-18

## 2025-03-18 DIAGNOSIS — E78.5 DYSLIPIDEMIA: Primary | ICD-10-CM

## 2025-03-18 RX ORDER — EZETIMIBE 10 MG/1
10 TABLET ORAL DAILY
Qty: 90 TABLET | Refills: 3 | Status: SHIPPED | OUTPATIENT
Start: 2025-03-18 | End: 2026-03-13

## 2025-03-18 RX ORDER — EZETIMIBE 10 MG/1
10 TABLET ORAL DAILY
Qty: 90 TABLET | Refills: 3 | Status: SHIPPED | OUTPATIENT
Start: 2025-03-18 | End: 2025-03-18 | Stop reason: SDUPTHER

## 2025-04-30 DIAGNOSIS — H10.13 ALLERGIC CONJUNCTIVITIS OF BOTH EYES: ICD-10-CM

## 2025-04-30 DIAGNOSIS — J30.2 SEASONAL ALLERGIES: Primary | ICD-10-CM

## 2025-04-30 RX ORDER — OLOPATADINE HYDROCHLORIDE 1 MG/ML
1 SOLUTION/ DROPS OPHTHALMIC 2 TIMES DAILY
Qty: 5 ML | Refills: 1 | Status: SHIPPED | OUTPATIENT
Start: 2025-04-30

## 2025-04-30 RX ORDER — MONTELUKAST SODIUM 10 MG/1
10 TABLET ORAL
Qty: 30 TABLET | Refills: 1 | Status: SHIPPED | OUTPATIENT
Start: 2025-04-30

## 2025-05-07 ENCOUNTER — OFFICE VISIT (OUTPATIENT)
Dept: FAMILY MEDICINE CLINIC | Facility: CLINIC | Age: 45
End: 2025-05-07
Payer: COMMERCIAL

## 2025-05-07 VITALS
OXYGEN SATURATION: 99 % | DIASTOLIC BLOOD PRESSURE: 68 MMHG | SYSTOLIC BLOOD PRESSURE: 104 MMHG | WEIGHT: 173 LBS | HEART RATE: 58 BPM | HEIGHT: 68 IN | RESPIRATION RATE: 16 BRPM | BODY MASS INDEX: 26.22 KG/M2

## 2025-05-07 DIAGNOSIS — Z00.00 PHYSICAL EXAM, ANNUAL: Primary | ICD-10-CM

## 2025-05-07 DIAGNOSIS — K90.0 CELIAC SPRUE: ICD-10-CM

## 2025-05-07 DIAGNOSIS — J30.2 SEASONAL ALLERGIES: ICD-10-CM

## 2025-05-07 DIAGNOSIS — E78.5 DYSLIPIDEMIA: ICD-10-CM

## 2025-05-07 PROBLEM — M76.72 PERONEAL TENDONITIS, LEFT: Status: RESOLVED | Noted: 2021-12-01 | Resolved: 2025-05-07

## 2025-05-07 PROBLEM — M72.2 PLANTAR FASCIITIS, LEFT: Status: RESOLVED | Noted: 2021-12-01 | Resolved: 2025-05-07

## 2025-05-07 PROBLEM — Z20.822: Status: RESOLVED | Noted: 2021-12-02 | Resolved: 2025-05-07

## 2025-05-07 PROBLEM — M24.812 INTERNAL DERANGEMENT OF SHOULDER, LEFT: Status: RESOLVED | Noted: 2021-10-07 | Resolved: 2025-05-07

## 2025-05-07 PROBLEM — S93.401A MODERATE RIGHT ANKLE SPRAIN: Status: RESOLVED | Noted: 2023-02-24 | Resolved: 2025-05-07

## 2025-05-07 PROBLEM — M75.102 TEAR OF LEFT SUPRASPINATUS TENDON: Status: RESOLVED | Noted: 2021-11-29 | Resolved: 2025-05-07

## 2025-05-07 PROBLEM — M24.811 INTERNAL DERANGEMENT OF RIGHT SHOULDER: Status: RESOLVED | Noted: 2020-08-13 | Resolved: 2025-05-07

## 2025-05-07 PROCEDURE — 99396 PREV VISIT EST AGE 40-64: CPT | Performed by: FAMILY MEDICINE

## 2025-05-07 RX ORDER — FEXOFENADINE HCL 180 MG/1
180 TABLET ORAL DAILY
Start: 2025-05-07

## 2025-05-07 RX ORDER — FLUTICASONE PROPIONATE 50 MCG
1 SPRAY, SUSPENSION (ML) NASAL DAILY
Start: 2025-05-07

## 2025-05-07 RX ORDER — ROSUVASTATIN CALCIUM 10 MG/1
10 TABLET, COATED ORAL DAILY
Qty: 90 TABLET | Refills: 3 | Status: SHIPPED | OUTPATIENT
Start: 2025-05-07

## 2025-05-07 NOTE — PROGRESS NOTES
Subjective:      Patient ID: Ash Da Silva is a 44 y.o. male.    44-year-old male with past medical history of hyperlipidemia and family history of heart disease presents to the office for annual physical examination.  Patient did have blood work done in March which was reviewed.  LP(a) at 139 but remainder of labs are acceptable.  He has been physically active playing quite a bit of hockey during the winter and hopefully will be mountain biking this summer.  Has also been doing some running.  He is taking Crestor 20 mg every other day.  Daily Crestor does cause some muscular pain in his quads if he is taking it every day.  Has also been following a high-protein gluten-free diet and has lost quite a bit of weight.  Does have history of celiac disease.        Past Medical History:   Diagnosis Date   • COVID    • Hyperlipidemia    • Occupational exposure to COVID-19 virus 12/02/2021       Family History   Problem Relation Age of Onset   • Hyperlipidemia Mother    • Heart disease Father    • COPD Father    • Hypertension Neg Hx        Past Surgical History:   Procedure Laterality Date   • COLONOSCOPY     • DENTAL SURGERY     • LA SURGICAL ARTHROSCOPY SHOULDER W/ROTATOR CUFF RPR Left 4/17/2024    Procedure: SHOULDER ARTHROSCOPIC ROTATOR CUFF REPAIR, BICEPS REPAIR, SAD;  Surgeon: Pratik Wells MD;  Location: [facility] MAIN OR;  Service: Orthopedics   • TONSILLECTOMY          reports that he has never smoked. He has never used smokeless tobacco. He reports current alcohol use. He reports that he does not use drugs.      Current Outpatient Medications:   •  ezetimibe (ZETIA) 10 mg tablet, Take 1 tablet (10 mg total) by mouth daily, Disp: 90 tablet, Rfl: 3  •  fexofenadine (ALLEGRA) 180 MG tablet, Take 1 tablet (180 mg total) by mouth daily, Disp: , Rfl:   •  fluticasone (FLONASE) 50 mcg/act nasal spray, 1 spray into each nostril daily, Disp: , Rfl:   •  olopatadine (PATANOL) 0.1 % ophthalmic solution, Administer  "1 drop to both eyes 2 (two) times a day, Disp: 5 mL, Rfl: 1  •  pantoprazole (PROTONIX) 40 mg tablet, Take 1 tablet (40 mg total) by mouth daily, Disp: 90 tablet, Rfl: 1  •  rosuvastatin (CRESTOR) 10 MG tablet, Take 1 tablet (10 mg total) by mouth daily, Disp: 90 tablet, Rfl: 3  •  montelukast (SINGULAIR) 10 mg tablet, Take 1 tablet (10 mg total) by mouth daily at bedtime (Patient not taking: Reported on 5/7/2025), Disp: 30 tablet, Rfl: 1    The following portions of the patient's history were reviewed and updated as appropriate: allergies, current medications, past family history, past medical history, past social history, past surgical history and problem list.    Review of Systems   Constitutional: Negative.    HENT: Negative.     Eyes: Negative.    Respiratory: Negative.     Cardiovascular: Negative.    Gastrointestinal: Negative.    Endocrine: Negative.    Genitourinary: Negative.    Musculoskeletal: Negative.    Skin: Negative.    Allergic/Immunologic: Negative.    Neurological: Negative.    Hematological: Negative.    Psychiatric/Behavioral: Negative.     All other systems reviewed and are negative.          Objective:    /68   Pulse 58   Resp 16   Ht 5' 8\" (1.727 m)   Wt 78.5 kg (173 lb)   SpO2 99%   BMI 26.30 kg/m²      Physical Exam  Vitals and nursing note reviewed.   Constitutional:       General: He is not in acute distress.     Appearance: Normal appearance. He is well-developed and normal weight. He is not ill-appearing.   HENT:      Head: Normocephalic and atraumatic.      Right Ear: Tympanic membrane, ear canal and external ear normal.      Left Ear: Tympanic membrane, ear canal and external ear normal.      Nose: Nose normal.      Mouth/Throat:      Mouth: Mucous membranes are moist.   Eyes:      Extraocular Movements: Extraocular movements intact.      Conjunctiva/sclera: Conjunctivae normal.      Pupils: Pupils are equal, round, and reactive to light.   Cardiovascular:      Rate and " Rhythm: Normal rate and regular rhythm.      Pulses: Normal pulses.      Heart sounds: Normal heart sounds. No murmur heard.  Pulmonary:      Effort: Pulmonary effort is normal.      Breath sounds: Normal breath sounds.   Abdominal:      General: Abdomen is flat. Bowel sounds are normal.      Palpations: Abdomen is soft.   Musculoskeletal:         General: Normal range of motion.      Cervical back: Normal range of motion and neck supple.   Skin:     General: Skin is warm and dry.   Neurological:      General: No focal deficit present.      Mental Status: He is alert and oriented to person, place, and time. Mental status is at baseline.   Psychiatric:         Mood and Affect: Mood normal.         Behavior: Behavior normal.         Thought Content: Thought content normal.         Judgment: Judgment normal.           Recent Results (from the past 16 weeks)   Hepatic function panel    Collection Time: 01/16/25  3:24 PM   Result Value Ref Range    Total Bilirubin 1.06 (H) 0.20 - 1.00 mg/dL    Bilirubin, Direct 0.17 0.00 - 0.20 mg/dL    Alkaline Phosphatase 66 34 - 104 U/L    AST 27 13 - 39 U/L    ALT 36 7 - 52 U/L    Total Protein 7.1 6.4 - 8.4 g/dL    Albumin 4.8 3.5 - 5.0 g/dL   Methylmalonic acid, serum    Collection Time: 01/16/25  3:24 PM   Result Value Ref Range    Methylmalonic Acid, S 188 0 - 378 nmol/L   TSH, 3rd generation with Free T4 reflex    Collection Time: 01/16/25  3:24 PM   Result Value Ref Range    TSH 3RD GENERATON 0.876 0.450 - 4.500 uIU/mL   Vitamin B12    Collection Time: 01/16/25  3:24 PM   Result Value Ref Range    Vitamin B-12 351 180 - 914 pg/mL   Vitamin D 25 hydroxy    Collection Time: 01/16/25  3:24 PM   Result Value Ref Range    Vit D, 25-Hydroxy 39.2 30.0 - 100.0 ng/mL   Folate    Collection Time: 01/16/25  3:24 PM   Result Value Ref Range    Folate 10.4 >5.9 ng/mL   Ferritin    Collection Time: 01/16/25  3:24 PM   Result Value Ref Range    Ferritin 64 24 - 336 ng/mL   CBC and  differential    Collection Time: 01/16/25  3:24 PM   Result Value Ref Range    WBC 5.13 4.31 - 10.16 Thousand/uL    RBC 4.65 3.88 - 5.62 Million/uL    Hemoglobin 14.2 12.0 - 17.0 g/dL    Hematocrit 43.1 36.5 - 49.3 %    MCV 93 82 - 98 fL    MCH 30.5 26.8 - 34.3 pg    MCHC 32.9 31.4 - 37.4 g/dL    RDW 12.5 11.6 - 15.1 %    MPV 10.4 8.9 - 12.7 fL    Platelets 223 149 - 390 Thousands/uL    nRBC 0 /100 WBCs    Segmented % 53 43 - 75 %    Immature Grans % 0 0 - 2 %    Lymphocytes % 33 14 - 44 %    Monocytes % 8 4 - 12 %    Eosinophils Relative 5 0 - 6 %    Basophils Relative 1 0 - 1 %    Absolute Neutrophils 2.75 1.85 - 7.62 Thousands/µL    Absolute Immature Grans 0.01 0.00 - 0.20 Thousand/uL    Absolute Lymphocytes 1.68 0.60 - 4.47 Thousands/µL    Absolute Monocytes 0.41 0.17 - 1.22 Thousand/µL    Eosinophils Absolute 0.24 0.00 - 0.61 Thousand/µL    Basophils Absolute 0.04 0.00 - 0.10 Thousands/µL   Basic metabolic panel    Collection Time: 01/16/25  3:24 PM   Result Value Ref Range    Sodium 141 135 - 147 mmol/L    Potassium 3.8 3.5 - 5.3 mmol/L    Chloride 103 96 - 108 mmol/L    CO2 32 21 - 32 mmol/L    ANION GAP 6 4 - 13 mmol/L    BUN 20 5 - 25 mg/dL    Creatinine 1.20 0.60 - 1.30 mg/dL    Glucose 97 65 - 140 mg/dL    Calcium 9.8 8.4 - 10.2 mg/dL    eGFR 72 ml/min/1.73sq m   Hemoglobin A1C    Collection Time: 03/03/25 10:19 AM   Result Value Ref Range    Hemoglobin A1C 5.4 Normal 4.0-5.6%; PreDiabetic 5.7-6.4%; Diabetic >=6.5%; Glycemic control for adults with diabetes <7.0% %     mg/dl   Magnesium, RBC    Collection Time: 03/03/25 10:19 AM   Result Value Ref Range    Magnesium, RBC 4.7 3.7 - 7.0 mg/dL   Comprehensive metabolic panel    Collection Time: 03/03/25 10:19 AM   Result Value Ref Range    Sodium 139 135 - 147 mmol/L    Potassium 4.4 3.5 - 5.3 mmol/L    Chloride 104 96 - 108 mmol/L    CO2 30 21 - 32 mmol/L    ANION GAP 5 4 - 13 mmol/L    BUN 26 (H) 5 - 25 mg/dL    Creatinine 1.06 0.60 - 1.30 mg/dL     Glucose 66 65 - 140 mg/dL    Calcium 9.4 8.4 - 10.2 mg/dL    AST 22 13 - 39 U/L    ALT 31 7 - 52 U/L    Alkaline Phosphatase 60 34 - 104 U/L    Total Protein 6.6 6.4 - 8.4 g/dL    Albumin 4.5 3.5 - 5.0 g/dL    Total Bilirubin 1.26 (H) 0.20 - 1.00 mg/dL    eGFR 83 ml/min/1.73sq m   Lipid panel    Collection Time: 03/15/25  9:18 AM   Result Value Ref Range    Cholesterol 169 See Comment mg/dL    Triglycerides 71 See Comment mg/dL    HDL, Direct 54 >=40 mg/dL    LDL Calculated 101 (H) 0 - 100 mg/dL    Non-HDL-Chol (CHOL-HDL) 115 mg/dl   Lipoprotein A (LPA)    Collection Time: 03/15/25  9:18 AM   Result Value Ref Range    Lipoprotein (a) 139.3 (H) <75.0 nmol/L        Assessment/Plan:    Celiac sprue  Patient does follow a gluten-free diet    Dyslipidemia  Trying to keep his LDL less than 100.  Does have elevated lipoprotein a at 139.  Remains on rosuvastatin.  Continues to be physically active.  Does have family history of heart disease.  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  Allergies have been tolerable and fairly well-controlled.  Never had to start taking Singulair.  Initial onset of spring weather was particularly difficult but things should be improving getting into the summer          Problem List Items Addressed This Visit        Digestive    Celiac sprue    Patient does follow a gluten-free diet            Other    Dyslipidemia    Trying to keep his LDL less than 100.  Does have elevated lipoprotein a at 139.  Remains on rosuvastatin.  Continues to be physically active.  Does have family history of heart disease.  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  Allergies have been tolerable and fairly well-controlled.  Never had to start taking Singulair.  Initial onset of spring weather was particularly difficult but things should be improving getting into the summer         Relevant Medications    fexofenadine (ALLEGRA) 180 MG tablet    fluticasone (FLONASE) 50 mcg/act nasal spray

## 2025-05-07 NOTE — ASSESSMENT & PLAN NOTE
Annual physical examination performed    - Patient is current on screening blood work as well as screening studies including colonoscopy as well as upper endoscopy    - Patient does have repeat lipid panel ordered to be done in [place]

## 2025-05-07 NOTE — ASSESSMENT & PLAN NOTE
Trying to keep his LDL less than 100.  Does have elevated lipoprotein a at 139.  Remains on rosuvastatin.  Continues to be physically active.  Does have family history of heart disease.  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

## 2025-05-07 NOTE — ASSESSMENT & PLAN NOTE
Patient is using over-the-counter fluticasone nasal spray as well as Allegra.  Allergies have been tolerable and fairly well-controlled.  Never had to start taking Singulair.  Initial onset of spring weather was particularly difficult but things should be improving getting into the summer

## 2025-05-08 DIAGNOSIS — Z82.49 FAMILY HISTORY OF HEART DISEASE: ICD-10-CM

## 2025-05-08 DIAGNOSIS — E78.5 DYSLIPIDEMIA: Primary | ICD-10-CM

## 2025-05-27 VITALS — WEIGHT: 173 LBS | BODY MASS INDEX: 26.22 KG/M2 | HEIGHT: 68 IN

## 2025-05-27 DIAGNOSIS — S43.51XA SPRAIN OF RIGHT ACROMIOCLAVICULAR JOINT, INITIAL ENCOUNTER: Primary | ICD-10-CM

## 2025-05-27 PROCEDURE — 99214 OFFICE O/P EST MOD 30 MIN: CPT | Performed by: ORTHOPAEDIC SURGERY

## 2025-05-27 NOTE — ASSESSMENT & PLAN NOTE
Orders:    Ambulatory Referral to Physical Therapy; Future  The patient has an examination consistent with sprain of right AC joint. I have discussed with the patient the pathophysiology of this diagnosis and reviewed how the examination correlates with this diagnosis.  Treatment options were discussed at length to included intra-articular CS injection and PT initially and further imaging should symptoms persist. After discussing these treatment options, the patient elected for physical therapy at this time. He may follow up in 6-8 weeks if symptoms persist for a CSI and further evaluation.  Advanced imaging the form of an MRI could be considered as well to better solidify the diagnosis if the symptoms persist despite treatment

## 2025-05-27 NOTE — PROGRESS NOTES
I personally examined the patient and reviewed the history provided.  I agree with the note and the assessment and plan by Dr. Alicia Devlin MD.     Assessment & Plan  Sprain of right acromioclavicular joint, initial encounter    Orders:    Ambulatory Referral to Physical Therapy; Future  The patient has an examination consistent with sprain of right AC joint. I have discussed with the patient the pathophysiology of this diagnosis and reviewed how the examination correlates with this diagnosis.  Treatment options were discussed at length to included intra-articular CS injection and PT initially and further imaging should symptoms persist. After discussing these treatment options, the patient elected for physical therapy at this time. He may follow up in 6-8 weeks if symptoms persist for a CSI and further evaluation.  Advanced imaging the form of an MRI could be considered as well to better solidify the diagnosis if the symptoms persist despite treatment      Subjective:   Patient ID: Ash Da Silva is a 44 y.o. male      HPI  The patient presents with a chief complaint of right shoulder pain.   The pain began 2 month(s) ago and is associated with an acute injury.  He fell onto his right shoulder after . The patient describes the pain as aching in intensity,  intermittent in timing, and localizes the pain to the  right AC joint.  The pain is worse with weightlifting and relieved by rest, ice.  The pain is not associated with numbness and tingling.  The pain is not associated with constitutional symptoms. The patient is not awoken at night by the pain.  He has had no prior treatments for this.  He does have a past surgical history of left rotator cuff repair, long head biceps tenodesis, and subacromial decompression DOS 4/17/2024     The following portions of the patient's history were reviewed and updated as appropriate: allergies, current medications, past family history, past medical history, past social history,  "past surgical history and problem list.      Objective:  Ht 5' 8\" (1.727 m) Comment: verbal  Wt 78.5 kg (173 lb)   BMI 26.30 kg/m²       Right Shoulder Exam     Tenderness   The patient is experiencing tenderness in the acromioclavicular joint.    Range of Motion   Active abduction:  170   Passive abduction:  170   External rotation:  90   Forward flexion:  180     Muscle Strength   Abduction: 5/5   Internal rotation: 5/5   External rotation: 5/5   Supraspinatus: 5/5   Subscapularis: 5/5   Biceps: 5/5     Other   Sensation: normal  Pulse: present          I have personally reviewed pertinent films in PACS and my interpretation is as follows.    Xray right shoulder show widening of the AC joint consistent with an AC joint sprain. Well aligned GH joint without degenerative changes. No acute fracture noted.    Records Reviewed: x-ray reports from right shoulder xray obtained 2/1/25 reviewed    "

## 2025-05-30 PROBLEM — H10.13 ALLERGIC CONJUNCTIVITIS OF BOTH EYES: Status: RESOLVED | Noted: 2025-04-30 | Resolved: 2025-05-30

## 2025-06-03 ENCOUNTER — TELEPHONE (OUTPATIENT)
Age: 45
End: 2025-06-03

## 2025-06-03 NOTE — TELEPHONE ENCOUNTER
Follow Up:     Pt under care of:  jennie  Office Location: [facility]      Last Seen (include Follow Up recommendations of last visit- see Office Visit - Instructions):     Pt calling due to: pt is rescheduling consult for a vasectomy .  PT last consult was 12/2023. Pt had to cancel vasectomy due to shoulder surgery.      Active Symptoms?  Explain:     Pt can be reached at:[phone]     Appointment Details: Date 6/ 16/  Time:  11  Location:[place]    Provider:Kraig       Does the appointment need further review? (Reason

## 2025-06-16 ENCOUNTER — OFFICE VISIT (OUTPATIENT)
Dept: UROLOGY | Facility: AMBULATORY SURGERY CENTER | Age: 45
End: 2025-06-16
Payer: COMMERCIAL

## 2025-06-16 VITALS
SYSTOLIC BLOOD PRESSURE: 110 MMHG | WEIGHT: 167 LBS | HEIGHT: 68 IN | OXYGEN SATURATION: 99 % | BODY MASS INDEX: 25.31 KG/M2 | DIASTOLIC BLOOD PRESSURE: 66 MMHG | HEART RATE: 48 BPM

## 2025-06-16 DIAGNOSIS — Z30.2 ENCOUNTER FOR VASECTOMY: Primary | ICD-10-CM

## 2025-06-16 DIAGNOSIS — Z30.09 VASECTOMY EVALUATION: Primary | ICD-10-CM

## 2025-06-16 PROCEDURE — 99203 OFFICE O/P NEW LOW 30 MIN: CPT

## 2025-06-16 RX ORDER — ALPRAZOLAM 1 MG/1
TABLET ORAL
Qty: 1 TABLET | Refills: 0 | Status: SHIPPED | OUTPATIENT
Start: 2025-06-16

## 2025-06-16 NOTE — PROGRESS NOTES
6/16/2025      Chief Complaint   Patient presents with    Sterilization     Second consult           Assessment and Plan    44 y.o. male managed by new patient    1. Desire for elective sterilization  - exam today as below  - informed consent signed today  - continue/ensure continued contraception until sterilization confirmed below  - rx xanax 1mg with  to/from on appt date  - shave all penile/scrotal/pubic hair day prior to appt date  - need for post-vasectomy semen analysis at 8 weeks after procedure to confirm sterility    Return for vasectomy in office.      History of Present Illness  Ash Da Silva is a 44 y.o. male here for evaluation of VASECTOMY CONSULT    History of genitourinary or groin trauma or surgery- None  Fathered children- Two children   Personal and/or mutual desire for permanent sterility- Shared decision   Current contraceptive method- Condoms and natural planning   Work/manual labor/lifting- Cardiology MD for [employer]  Voiding issues- none  Bleeding issues/thinners- none  Allergies to lidocaine/marcaine/betadine/chromic- none    The patient presents requesting elective sterilization vasectomy.     We discussed that vasectomy is in operation performed in the office in order to provide elective sterilization. There are instances in which body habitus or changes to the genital tissue/anatomy would necessitate procedure done in a surgical suite/hospital operating room. Physical exam is performed today to assess the candidate specifically for this reason.    This procedure should be considered a permanent option. Although there are subspecialists who perform vasectomy reversals, these operations are not 100% successful and are often not covered by insurance meaning they can come with a large out-of-pocket cost. The patient understands this.     We reviewed the procedure in depth. Risk and benefits of the procedure were discussed and reviewed. Informed consent was obtained in the office  "today. The patient was prescribed a benzodiazepine to take one hour prior to the procedure to assist with his comfort.  He understands that he will require transportation by a sober  to and from the office that day if he is to use the benzodiazepine.       He also understands he will require semen analysis testing at 8 weeks post procedure to ensure full sterilization.  In the interim, he will require contraception during intercourse to avoid an undesired pregnancy.     Usually, patients are out of work for 2-3 days. We recommend tight fitting scrotal support following the procedure along with ice packs applied to the scrotum 15 minutes on and 15 minutes off for the first 24-48 hours. We discussed that we do send the patient home with short course of anti-inflammatory and/or narcotic pain medication.     After this discussion, the patient agrees to proceed. We will schedule him in the near future.    He agrees to take oral sedative - xanax 1mg one hour prior to procedure.        Review of Systems   Constitutional:  Negative for chills and fever.   HENT:  Negative for ear pain and sore throat.    Eyes:  Negative for pain and visual disturbance.   Respiratory:  Negative for cough and shortness of breath.    Cardiovascular:  Negative for chest pain and palpitations.   Gastrointestinal:  Negative for abdominal pain and vomiting.   Genitourinary:  Positive for urgency. Negative for decreased urine volume, difficulty urinating, dysuria, flank pain, frequency and hematuria.   Musculoskeletal:  Negative for arthralgias and back pain.   Skin:  Negative for color change and rash.   Neurological:  Negative for seizures and syncope.   All other systems reviewed and are negative.               Vitals  Vitals:    06/16/25 1053   BP: 110/66   BP Location: Left arm   Patient Position: Sitting   Cuff Size: Standard   Pulse: (!) 48   SpO2: 99%   Weight: 75.8 kg (167 lb)   Height: 5' 8\" (1.727 m)       Physical Exam  Vitals " reviewed.   Constitutional:       General: He is not in acute distress.     Appearance: Normal appearance. He is not ill-appearing.   HENT:      Head: Normocephalic and atraumatic.      Nose: Nose normal.     Eyes:      General: No scleral icterus.    Pulmonary:      Effort: No respiratory distress.   Abdominal:      General: Abdomen is flat. There is no distension.      Palpations: Abdomen is soft.      Tenderness: There is no abdominal tenderness.     Musculoskeletal:         General: Normal range of motion.      Cervical back: Normal range of motion.     Skin:     General: Skin is warm.      Coloration: Skin is not jaundiced.     Neurological:      Mental Status: He is alert and oriented to person, place, and time.      Gait: Gait normal.     Psychiatric:         Mood and Affect: Mood normal.         Behavior: Behavior normal.         General: Well appearing, no distress, appears stated age.  HEENT:  Normocephalic, atraumatic. Conjunctiva clear.  Respiratory: Nonlabored respirations, no wheeze or cough  Abdomen:  Soft nontender without hernia. No suprapubic or CVA tenderness.  Genitourinary: Circumcised penis, normal phallus, orthotopic patent meatus.  Testes smooth descended bilaterally into the scrotum nontender with no palpable mass.  Palpably normal spermatic cord and vas deferens bilaterally.  Musculoskeletal:  Normal range of motion and gait without defecit.  Neuro: No gross neurologic defect or abnormality. Steady unassisted gait. Speech and affect normal.  Dermatologic: skin warm, dry; no rash erythema or ecchymosis      Past History  Past Medical History[1]  Social History[2]  Tobacco Use History[3]  Family History[4]    The following portions of the patient's history were reviewed and updated as appropriate: allergies, current medications, past medical history, past social history, past surgical history and problem list.    Results  No results found for this or any previous visit (from the past  "hour).]  No results found for: \"PSA\"  Lab Results   Component Value Date    GLUCOSE 91 06/25/2014    CALCIUM 9.4 03/03/2025     06/25/2014    K 4.4 03/03/2025    CO2 30 03/03/2025     03/03/2025    BUN 26 (H) 03/03/2025    CREATININE 1.06 03/03/2025     Lab Results   Component Value Date    WBC 5.13 01/16/2025    HGB 14.2 01/16/2025    HCT 43.1 01/16/2025    MCV 93 01/16/2025     01/16/2025             [1]   Past Medical History:  Diagnosis Date    COVID     Hyperlipidemia     Occupational exposure to COVID-19 virus 12/02/2021   [2]   Social History  Socioeconomic History    Marital status: /Civil Union   Tobacco Use    Smoking status: Never    Smokeless tobacco: Never   Vaping Use    Vaping status: Never Used   Substance and Sexual Activity    Alcohol use: Yes     Comment: 4 per week    Drug use: No   [3]   Social History  Tobacco Use   Smoking Status Never   Smokeless Tobacco Never   [4]   Family History  Problem Relation Name Age of Onset    Hyperlipidemia Mother      Heart disease Father      COPD Father      Hypertension Neg Hx       "

## (undated) DEVICE — SUT MONOCRYL 4-0 PS-2 27 IN Y426H

## (undated) DEVICE — INTENDED FOR TISSUE SEPARATION, AND OTHER PROCEDURES THAT REQUIRE A SHARP SURGICAL BLADE TO PUNCTURE OR CUT.: Brand: BARD-PARKER ® CARBON RIB-BACK BLADES

## (undated) DEVICE — GLOVE SRG BIOGEL 7.5

## (undated) DEVICE — PACK PBDS SHOULDER ARTHROSCOPY RF

## (undated) DEVICE — SKN PRP WNG SPNGE PVP SCRB STR: Brand: MEDLINE INDUSTRIES, INC.

## (undated) DEVICE — DRESSING MEPILEX AG BORDER 4 X 4 IN

## (undated) DEVICE — THREADED CLEAR CANNULA WITH OBTURATOR 7MM X 75MM

## (undated) DEVICE — 3M™ STERI-STRIP™ BLEND TONE SKIN CLOSURES, B1557, TAN, 1/2 IN X 4 IN (12MM X 100MM), 6 STRIPS/ENVELOPE: Brand: 3M™ STERI-STRIP™

## (undated) DEVICE — GLOVE SRG BIOGEL ECLIPSE 7

## (undated) DEVICE — THREADED CLEAR CANNULA WITH OBTURATOR 8.5MM X 75MM

## (undated) DEVICE — 3M™ IOBAN™ 2 ANTIMICROBIAL INCISE DRAPE 6650EZ: Brand: IOBAN™ 2

## (undated) DEVICE — BLADE SHAVER DISSECTOR 4MM 13CM COOLCUT

## (undated) DEVICE — 3M™ STERI-STRIP™ REINFORCED ADHESIVE SKIN CLOSURES, R1547, 1/2 IN X 4 IN (12 MM X 100 MM), 6 STRIPS/ENVELOPE: Brand: 3M™ STERI-STRIP™

## (undated) DEVICE — EXPRESSEW III SUTURE NEEDLE FOR USE WITH EXPRESSEW II OR III SUTURE PASSER: Brand: EXPRESSEW

## (undated) DEVICE — VAPR COOLPULSE 90 ELECTRODE 90 DEGREES SUCTION WITH INTEGRATED HANDPIECE: Brand: VAPR COOLPULSE

## (undated) DEVICE — SHOULDER SUSPENSION KIT 6 PER BOX

## (undated) DEVICE — TUBING SUCTION 5MM X 12 FT

## (undated) DEVICE — GLOVE INDICATOR PI UNDERGLOVE SZ 7.5 BLUE

## (undated) DEVICE — BLADE SHAVER DISSECTOR 3.5MM 13CM COOLCUT